# Patient Record
Sex: FEMALE | Race: WHITE | NOT HISPANIC OR LATINO | URBAN - METROPOLITAN AREA
[De-identification: names, ages, dates, MRNs, and addresses within clinical notes are randomized per-mention and may not be internally consistent; named-entity substitution may affect disease eponyms.]

---

## 2017-04-22 ENCOUNTER — OUTPATIENT (OUTPATIENT)
Dept: OUTPATIENT SERVICES | Facility: HOSPITAL | Age: 82
LOS: 1 days | Discharge: HOME | End: 2017-04-22

## 2017-06-27 DIAGNOSIS — D51.8 OTHER VITAMIN B12 DEFICIENCY ANEMIAS: ICD-10-CM

## 2017-07-12 ENCOUNTER — OUTPATIENT (OUTPATIENT)
Dept: OUTPATIENT SERVICES | Facility: HOSPITAL | Age: 82
LOS: 1 days | Discharge: HOME | End: 2017-07-12

## 2017-07-12 DIAGNOSIS — E78.5 HYPERLIPIDEMIA, UNSPECIFIED: ICD-10-CM

## 2017-07-12 DIAGNOSIS — D64.9 ANEMIA, UNSPECIFIED: ICD-10-CM
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
## 2017-07-12 DIAGNOSIS — N39.0 URINARY TRACT INFECTION, SITE NOT SPECIFIED: ICD-10-CM

## 2017-07-12 DIAGNOSIS — E53.8 DEFICIENCY OF OTHER SPECIFIED B GROUP VITAMINS: ICD-10-CM

## 2017-07-12 DIAGNOSIS — I63.9 CEREBRAL INFARCTION, UNSPECIFIED: ICD-10-CM

## 2017-07-12 DIAGNOSIS — E55.9 VITAMIN D DEFICIENCY, UNSPECIFIED: ICD-10-CM

## 2017-07-12 DIAGNOSIS — E11.9 TYPE 2 DIABETES MELLITUS WITHOUT COMPLICATIONS: ICD-10-CM

## 2018-07-19 ENCOUNTER — OUTPATIENT (OUTPATIENT)
Dept: OUTPATIENT SERVICES | Facility: HOSPITAL | Age: 83
LOS: 1 days | Discharge: HOME | End: 2018-07-19

## 2018-07-19 DIAGNOSIS — I83.813 VARICOSE VEINS OF BILATERAL LOWER EXTREMITIES WITH PAIN: ICD-10-CM

## 2018-07-19 DIAGNOSIS — R10.9 UNSPECIFIED ABDOMINAL PAIN: ICD-10-CM

## 2018-07-19 DIAGNOSIS — I87.9 DISORDER OF VEIN, UNSPECIFIED: ICD-10-CM

## 2018-07-19 DIAGNOSIS — M25.569 PAIN IN UNSPECIFIED KNEE: ICD-10-CM

## 2019-07-16 ENCOUNTER — OUTPATIENT (OUTPATIENT)
Dept: OUTPATIENT SERVICES | Facility: HOSPITAL | Age: 84
LOS: 1 days | Discharge: HOME | End: 2019-07-16
Payer: MEDICARE

## 2019-07-16 DIAGNOSIS — R10.30 LOWER ABDOMINAL PAIN, UNSPECIFIED: ICD-10-CM

## 2019-07-16 PROCEDURE — 74177 CT ABD & PELVIS W/CONTRAST: CPT | Mod: 26

## 2019-07-30 ENCOUNTER — APPOINTMENT (OUTPATIENT)
Dept: GYNECOLOGIC ONCOLOGY | Facility: CLINIC | Age: 84
End: 2019-07-30
Payer: MEDICARE

## 2019-07-30 ENCOUNTER — OUTPATIENT (OUTPATIENT)
Dept: OUTPATIENT SERVICES | Facility: HOSPITAL | Age: 84
LOS: 1 days | Discharge: HOME | End: 2019-07-30

## 2019-07-30 VITALS
HEART RATE: 78 BPM | DIASTOLIC BLOOD PRESSURE: 80 MMHG | WEIGHT: 222 LBS | BODY MASS INDEX: 35.68 KG/M2 | RESPIRATION RATE: 20 BRPM | HEIGHT: 66 IN | TEMPERATURE: 98 F | SYSTOLIC BLOOD PRESSURE: 140 MMHG

## 2019-07-30 DIAGNOSIS — Z87.891 PERSONAL HISTORY OF NICOTINE DEPENDENCE: ICD-10-CM

## 2019-07-30 DIAGNOSIS — K21.9 GASTRO-ESOPHAGEAL REFLUX DISEASE W/OUT ESOPHAGITIS: ICD-10-CM

## 2019-07-30 DIAGNOSIS — Z80.0 FAMILY HISTORY OF MALIGNANT NEOPLASM OF DIGESTIVE ORGANS: ICD-10-CM

## 2019-07-30 DIAGNOSIS — Z86.73 PERSONAL HISTORY OF TRANSIENT ISCHEMIC ATTACK (TIA), AND CEREBRAL INFARCTION W/OUT RESIDUAL DEFICITS: ICD-10-CM

## 2019-07-30 DIAGNOSIS — Z86.59 PERSONAL HISTORY OF OTHER MENTAL AND BEHAVIORAL DISORDERS: ICD-10-CM

## 2019-07-30 DIAGNOSIS — Z78.9 OTHER SPECIFIED HEALTH STATUS: ICD-10-CM

## 2019-07-30 DIAGNOSIS — N85.00 ENDOMETRIAL HYPERPLASIA, UNSPECIFIED: ICD-10-CM

## 2019-07-30 DIAGNOSIS — R32 UNSPECIFIED URINARY INCONTINENCE: ICD-10-CM

## 2019-07-30 DIAGNOSIS — M05.20 RHEUMATOID VASCULITIS WITH RHEUMATOID ARTHRITIS OF UNSPECIFIED SITE: ICD-10-CM

## 2019-07-30 DIAGNOSIS — Z80.3 FAMILY HISTORY OF MALIGNANT NEOPLASM OF BREAST: ICD-10-CM

## 2019-07-30 DIAGNOSIS — N95.0 POSTMENOPAUSAL BLEEDING: ICD-10-CM

## 2019-07-30 PROCEDURE — 99204 OFFICE O/P NEW MOD 45 MIN: CPT | Mod: 25

## 2019-07-30 NOTE — PROCEDURE
[Cervical Pap] : cervical pap smear  [Other: ___] : [unfilled] [Patient] : the patient [Thickened Endometrium] : thickened endometrium [Verbal Consent] : verbal consent was obtained prior to the procedure and is detailed in the patient's record [Guardian] : the guardian

## 2019-07-30 NOTE — REVIEW OF SYSTEMS
[Negative] : Musculoskeletal [Abn Vag Bleeding] : abnormal vaginal bleeding [Incontinence] : incontinence [Hematuria] : no hematuria [Dysuria] : no dysuria [Vaginal Discharge] : no vaginal discharge [Dyspareunia] : no dyspareunia [Normal Sexual Function] : abnormal sexual function

## 2019-07-30 NOTE — PHYSICAL EXAM
[Normal] : Recto-Vaginal Exam: Normal [Abnormal] : Cervix: Abnormal [de-identified] : abnormally firm and nodular [Capable of only limited self care, confined to bed or chair more than 50% of waking hours] : Status 3- Capable of only limited self care, confined to bed or chair more than 50% of waking hours

## 2019-07-30 NOTE — DISCUSSION/SUMMARY
[FreeTextEntry1] : 85 yo P4 w/ LMP of 55 yo, w/ h/o CVA, here for postmenopausal bleeding and a thickened endometrium on imaging. The plan discussed with the pt and her son. Once medical clearance is obtained, the pt will be set up for examination under anesthesia and a D&C. \par

## 2019-07-30 NOTE — PAST MEDICAL HISTORY
[Menarche Age ____] : age at menarche was [unfilled] [Approximately ___] : the LMP was approximately [unfilled] [Menopause Age____] : age at menopause was [unfilled] [Total Preg ___] : G[unfilled] [Full Term ___] : Full Term: [unfilled] [Live Births ___] : P[unfilled]  [Living ___] : Living: [unfilled] [AB Spont ___] : miscarriages: [unfilled]  [Postmenopausal] : The patient is postmenopausal [Normal Amount/Duration] : it was of a normal amount and duration [Premature ___] : Premature: [unfilled] [Regular Cycle Intervals] : have been regular [Abortions ___] : Abortions:[unfilled] [Multiple Births ___] :  multiple birth pregnancies: [unfilled] [AB Induced ___] : elective abortions: [unfilled]  [Ectopics ___] : ectopic pregnancies: [unfilled]

## 2019-07-30 NOTE — LETTER BODY
[Dear  ___] : Dear  [unfilled], [I recently saw our patient [unfilled] for a follow-up visit.] : I recently saw our patient, [unfilled] for a follow-up visit. [Attached please find my note.] : Attached please find my note.

## 2019-07-30 NOTE — OB HISTORY
[Full Term ___] : [unfilled] (full-term) [Total Preg ___] : : [unfilled] [Premature ___] : [unfilled] (premature) [Abortions ___] : [unfilled] (abortions) [Living ___] : [unfilled] (living) [ ___] : [unfilled]  section delivery(s) [Vaginal ___] : [unfilled] vaginal delivery(s) [AB Spont ___] : [unfilled] miscarriage(s) [AB Induced ___] : [unfilled] elective (s) [Ectopics ___] : [unfilled] ectopic pregnancies [Multiple Births ___] : [unfilled] multiple births [Regular Cycle Intervals] : periods have been regular [Normal Amount/Duration] : was of a normal amount and duration [Menopause  Age ____] : menopause occurred at age [unfilled] [Menarche Age ____] : age at menarche was [unfilled]

## 2019-07-30 NOTE — HISTORY OF PRESENT ILLNESS
[FreeTextEntry1] : 83 yo , postmenopausal female, w/ LMP at age 54, w/ multiple comorbidities, w/ recent stroke, here for postmenopausal bleeding a couple of years ago w/ incidental endometrial thickening of 3.3 cm on CT scan. Pt reports last seeing a GYN 20 years ago, Dr. Silva. Pt was referred by her PMD Dr. Jerez. Since her last postmenopausal bleeding, a couple of years ago, denies any vaginal bleeding. Pt reports urinary incontinence and constipation for years now. \par \par Last mammogram: had all of the necessary screenings - all wnl per pt \par Last colonosocopy: a few years ago - all wnl per pt

## 2019-08-01 DIAGNOSIS — Z00.00 ENCOUNTER FOR GENERAL ADULT MEDICAL EXAMINATION WITHOUT ABNORMAL FINDINGS: ICD-10-CM

## 2019-08-02 LAB — HPV HIGH+LOW RISK DNA PNL CVX: NOT DETECTED

## 2019-08-07 ENCOUNTER — CHART COPY (OUTPATIENT)
Age: 84
End: 2019-08-07

## 2019-08-08 ENCOUNTER — OUTPATIENT (OUTPATIENT)
Dept: OUTPATIENT SERVICES | Facility: HOSPITAL | Age: 84
LOS: 1 days | Discharge: HOME | End: 2019-08-08
Payer: MEDICARE

## 2019-08-08 VITALS
DIASTOLIC BLOOD PRESSURE: 99 MMHG | WEIGHT: 176.37 LBS | OXYGEN SATURATION: 98 % | HEART RATE: 68 BPM | HEIGHT: 66 IN | RESPIRATION RATE: 18 BRPM | TEMPERATURE: 98 F | SYSTOLIC BLOOD PRESSURE: 154 MMHG

## 2019-08-08 DIAGNOSIS — Z90.89 ACQUIRED ABSENCE OF OTHER ORGANS: Chronic | ICD-10-CM

## 2019-08-08 DIAGNOSIS — Z01.818 ENCOUNTER FOR OTHER PREPROCEDURAL EXAMINATION: ICD-10-CM

## 2019-08-08 DIAGNOSIS — Z98.51 TUBAL LIGATION STATUS: Chronic | ICD-10-CM

## 2019-08-08 DIAGNOSIS — R19.00 INTRA-ABDOMINAL AND PELVIC SWELLING, MASS AND LUMP, UNSPECIFIED SITE: ICD-10-CM

## 2019-08-08 DIAGNOSIS — Z92.89 PERSONAL HISTORY OF OTHER MEDICAL TREATMENT: Chronic | ICD-10-CM

## 2019-08-08 LAB
ALBUMIN SERPL ELPH-MCNC: 3.3 G/DL — LOW (ref 3.5–5.2)
ALP SERPL-CCNC: 115 U/L — SIGNIFICANT CHANGE UP (ref 30–115)
ALT FLD-CCNC: 6 U/L — SIGNIFICANT CHANGE UP (ref 0–41)
ANION GAP SERPL CALC-SCNC: 13 MMOL/L — SIGNIFICANT CHANGE UP (ref 7–14)
APPEARANCE UR: CLEAR — SIGNIFICANT CHANGE UP
APTT BLD: 39.4 SEC — HIGH (ref 27–39.2)
AST SERPL-CCNC: 12 U/L — SIGNIFICANT CHANGE UP (ref 0–41)
BACTERIA # UR AUTO: ABNORMAL
BASOPHILS # BLD AUTO: 0.03 K/UL — SIGNIFICANT CHANGE UP (ref 0–0.2)
BASOPHILS NFR BLD AUTO: 0.2 % — SIGNIFICANT CHANGE UP (ref 0–1)
BILIRUB SERPL-MCNC: 0.2 MG/DL — SIGNIFICANT CHANGE UP (ref 0.2–1.2)
BILIRUB UR-MCNC: NEGATIVE — SIGNIFICANT CHANGE UP
BUN SERPL-MCNC: 21 MG/DL — HIGH (ref 10–20)
CALCIUM SERPL-MCNC: 9 MG/DL — SIGNIFICANT CHANGE UP (ref 8.5–10.1)
CHLORIDE SERPL-SCNC: 103 MMOL/L — SIGNIFICANT CHANGE UP (ref 98–110)
CO2 SERPL-SCNC: 25 MMOL/L — SIGNIFICANT CHANGE UP (ref 17–32)
COLOR SPEC: YELLOW — SIGNIFICANT CHANGE UP
CREAT SERPL-MCNC: 0.9 MG/DL — SIGNIFICANT CHANGE UP (ref 0.7–1.5)
DIFF PNL FLD: NEGATIVE — SIGNIFICANT CHANGE UP
EOSINOPHIL # BLD AUTO: 0.19 K/UL — SIGNIFICANT CHANGE UP (ref 0–0.7)
EOSINOPHIL NFR BLD AUTO: 1.5 % — SIGNIFICANT CHANGE UP (ref 0–8)
EPI CELLS # UR: 0 /HPF — SIGNIFICANT CHANGE UP (ref 0–5)
ESTIMATED AVERAGE GLUCOSE: 114 MG/DL — SIGNIFICANT CHANGE UP (ref 68–114)
GLUCOSE SERPL-MCNC: 92 MG/DL — SIGNIFICANT CHANGE UP (ref 70–99)
GLUCOSE UR QL: NEGATIVE — SIGNIFICANT CHANGE UP
HBA1C BLD-MCNC: 5.6 % — SIGNIFICANT CHANGE UP (ref 4–5.6)
HCT VFR BLD CALC: 34.1 % — LOW (ref 37–47)
HGB BLD-MCNC: 10.6 G/DL — LOW (ref 12–16)
HYALINE CASTS # UR AUTO: 1 /LPF — SIGNIFICANT CHANGE UP (ref 0–7)
IMM GRANULOCYTES NFR BLD AUTO: 0.7 % — HIGH (ref 0.1–0.3)
INR BLD: 1.04 RATIO — SIGNIFICANT CHANGE UP (ref 0.65–1.3)
KETONES UR-MCNC: NEGATIVE — SIGNIFICANT CHANGE UP
LEUKOCYTE ESTERASE UR-ACNC: ABNORMAL
LYMPHOCYTES # BLD AUTO: 16.1 % — LOW (ref 20.5–51.1)
LYMPHOCYTES # BLD AUTO: 2.09 K/UL — SIGNIFICANT CHANGE UP (ref 1.2–3.4)
MCHC RBC-ENTMCNC: 31.1 G/DL — LOW (ref 32–37)
MCHC RBC-ENTMCNC: 33.1 PG — HIGH (ref 27–31)
MCV RBC AUTO: 106.6 FL — HIGH (ref 81–99)
MONOCYTES # BLD AUTO: 0.46 K/UL — SIGNIFICANT CHANGE UP (ref 0.1–0.6)
MONOCYTES NFR BLD AUTO: 3.5 % — SIGNIFICANT CHANGE UP (ref 1.7–9.3)
NEUTROPHILS # BLD AUTO: 10.16 K/UL — HIGH (ref 1.4–6.5)
NEUTROPHILS NFR BLD AUTO: 78 % — HIGH (ref 42.2–75.2)
NITRITE UR-MCNC: POSITIVE
NRBC # BLD: 0 /100 WBCS — SIGNIFICANT CHANGE UP (ref 0–0)
PH UR: 5.5 — SIGNIFICANT CHANGE UP (ref 5–8)
PLATELET # BLD AUTO: 461 K/UL — HIGH (ref 130–400)
POTASSIUM SERPL-MCNC: 5.3 MMOL/L — HIGH (ref 3.5–5)
POTASSIUM SERPL-SCNC: 5.3 MMOL/L — HIGH (ref 3.5–5)
PROT SERPL-MCNC: 6.6 G/DL — SIGNIFICANT CHANGE UP (ref 6–8)
PROT UR-MCNC: SIGNIFICANT CHANGE UP
PROTHROM AB SERPL-ACNC: 11.9 SEC — SIGNIFICANT CHANGE UP (ref 9.95–12.87)
RBC # BLD: 3.2 M/UL — LOW (ref 4.2–5.4)
RBC # FLD: 14.2 % — SIGNIFICANT CHANGE UP (ref 11.5–14.5)
RBC CASTS # UR COMP ASSIST: 2 /HPF — SIGNIFICANT CHANGE UP (ref 0–4)
SODIUM SERPL-SCNC: 141 MMOL/L — SIGNIFICANT CHANGE UP (ref 135–146)
SP GR SPEC: 1.02 — SIGNIFICANT CHANGE UP (ref 1.01–1.02)
UROBILINOGEN FLD QL: SIGNIFICANT CHANGE UP
WBC # BLD: 13.02 K/UL — HIGH (ref 4.8–10.8)
WBC # FLD AUTO: 13.02 K/UL — HIGH (ref 4.8–10.8)
WBC UR QL: 37 /HPF — HIGH (ref 0–5)

## 2019-08-08 PROCEDURE — 71046 X-RAY EXAM CHEST 2 VIEWS: CPT | Mod: 26

## 2019-08-08 PROCEDURE — 93010 ELECTROCARDIOGRAM REPORT: CPT

## 2019-08-08 NOTE — H&P PST ADULT - NSICDXPASTMEDICALHX_GEN_ALL_CORE_FT
PAST MEDICAL HISTORY:  Anxiety     Arthritis     Depression     Diabetes mellitus     GERD (gastroesophageal reflux disease)     H/O rheumatoid arthritis     High cholesterol     HTN (hypertension)     Stroke 2017 PAST MEDICAL HISTORY:  Anxiety     Arthritis     Cerebral infarction due to vascular stenosis     Depression     Diabetes mellitus     GERD (gastroesophageal reflux disease)     H/O rheumatoid arthritis     High cholesterol     HTN (hypertension)     Right-sided muscle weakness     Stroke 2017

## 2019-08-08 NOTE — H&P PST ADULT - NSICDXFAMILYHX_GEN_ALL_CORE_FT
FAMILY HISTORY:  FH: breast cancer  FH: colon cancer  FH: heart attack  FH: lung cancer  FH: pancreatic cancer

## 2019-08-08 NOTE — H&P PST ADULT - NSICDXPASTSURGICALHX_GEN_ALL_CORE_FT
PAST SURGICAL HISTORY:  H/O tubal ligation     History of dental surgery     History of tonsillectomy

## 2019-08-08 NOTE — H&P PST ADULT - REASON FOR ADMISSION
patient presents for d & c. pt denies current cp,palp,cough,dysuria, fever.   cannot walk 1 fos or any blocks due to pain and stroke limiting mobility. reports mild sob upon exertion.  pt states this is complete med/surg history.

## 2019-08-22 ENCOUNTER — RESULT REVIEW (OUTPATIENT)
Age: 84
End: 2019-08-22

## 2019-08-22 ENCOUNTER — APPOINTMENT (OUTPATIENT)
Dept: GYNECOLOGIC ONCOLOGY | Facility: HOSPITAL | Age: 84
End: 2019-08-22
Payer: MEDICARE

## 2019-08-22 ENCOUNTER — OUTPATIENT (OUTPATIENT)
Dept: OUTPATIENT SERVICES | Facility: HOSPITAL | Age: 84
LOS: 1 days | Discharge: HOME | End: 2019-08-22
Payer: MEDICARE

## 2019-08-22 VITALS
SYSTOLIC BLOOD PRESSURE: 155 MMHG | RESPIRATION RATE: 18 BRPM | HEIGHT: 66 IN | WEIGHT: 175.05 LBS | TEMPERATURE: 98 F | HEART RATE: 57 BPM | DIASTOLIC BLOOD PRESSURE: 74 MMHG

## 2019-08-22 VITALS
OXYGEN SATURATION: 97 % | RESPIRATION RATE: 16 BRPM | SYSTOLIC BLOOD PRESSURE: 144 MMHG | DIASTOLIC BLOOD PRESSURE: 95 MMHG | HEART RATE: 72 BPM

## 2019-08-22 DIAGNOSIS — Z92.89 PERSONAL HISTORY OF OTHER MEDICAL TREATMENT: Chronic | ICD-10-CM

## 2019-08-22 DIAGNOSIS — Z98.51 TUBAL LIGATION STATUS: Chronic | ICD-10-CM

## 2019-08-22 DIAGNOSIS — Z90.89 ACQUIRED ABSENCE OF OTHER ORGANS: Chronic | ICD-10-CM

## 2019-08-22 PROBLEM — M62.81 MUSCLE WEAKNESS (GENERALIZED): Chronic | Status: ACTIVE | Noted: 2019-08-08

## 2019-08-22 PROBLEM — F32.9 MAJOR DEPRESSIVE DISORDER, SINGLE EPISODE, UNSPECIFIED: Chronic | Status: ACTIVE | Noted: 2019-08-08

## 2019-08-22 PROBLEM — E11.9 TYPE 2 DIABETES MELLITUS WITHOUT COMPLICATIONS: Chronic | Status: ACTIVE | Noted: 2019-08-08

## 2019-08-22 PROBLEM — I63.9 CEREBRAL INFARCTION, UNSPECIFIED: Chronic | Status: ACTIVE | Noted: 2019-08-08

## 2019-08-22 PROBLEM — F41.9 ANXIETY DISORDER, UNSPECIFIED: Chronic | Status: ACTIVE | Noted: 2019-08-08

## 2019-08-22 PROBLEM — M19.90 UNSPECIFIED OSTEOARTHRITIS, UNSPECIFIED SITE: Chronic | Status: ACTIVE | Noted: 2019-08-08

## 2019-08-22 PROBLEM — E78.00 PURE HYPERCHOLESTEROLEMIA, UNSPECIFIED: Chronic | Status: ACTIVE | Noted: 2019-08-08

## 2019-08-22 PROBLEM — Z87.39 PERSONAL HISTORY OF OTHER DISEASES OF THE MUSCULOSKELETAL SYSTEM AND CONNECTIVE TISSUE: Chronic | Status: ACTIVE | Noted: 2019-08-08

## 2019-08-22 PROBLEM — I10 ESSENTIAL (PRIMARY) HYPERTENSION: Chronic | Status: ACTIVE | Noted: 2019-08-08

## 2019-08-22 PROBLEM — K21.9 GASTRO-ESOPHAGEAL REFLUX DISEASE WITHOUT ESOPHAGITIS: Chronic | Status: ACTIVE | Noted: 2019-08-08

## 2019-08-22 LAB
ABO RH CONFIRMATION: SIGNIFICANT CHANGE UP
BLD GP AB SCN SERPL QL: SIGNIFICANT CHANGE UP

## 2019-08-22 PROCEDURE — 88344 IMHCHEM/IMCYTCHM EA MLT ANTB: CPT | Mod: 26,59

## 2019-08-22 PROCEDURE — 57522 CONIZATION OF CERVIX: CPT | Mod: 22

## 2019-08-22 PROCEDURE — 88342 IMHCHEM/IMCYTCHM 1ST ANTB: CPT | Mod: 26,59

## 2019-08-22 PROCEDURE — 88360 TUMOR IMMUNOHISTOCHEM/MANUAL: CPT | Mod: 26

## 2019-08-22 PROCEDURE — 88341 IMHCHEM/IMCYTCHM EA ADD ANTB: CPT | Mod: 26,59

## 2019-08-22 PROCEDURE — 88305 TISSUE EXAM BY PATHOLOGIST: CPT | Mod: 26

## 2019-08-22 PROCEDURE — 88313 SPECIAL STAINS GROUP 2: CPT | Mod: 26

## 2019-08-22 RX ORDER — ERGOCALCIFEROL 1.25 MG/1
1 CAPSULE ORAL
Qty: 0 | Refills: 0 | DISCHARGE

## 2019-08-22 RX ORDER — LOSARTAN POTASSIUM 100 MG/1
1 TABLET, FILM COATED ORAL
Qty: 0 | Refills: 0 | DISCHARGE

## 2019-08-22 RX ORDER — METOPROLOL TARTRATE 50 MG
1 TABLET ORAL
Qty: 0 | Refills: 0 | DISCHARGE

## 2019-08-22 RX ORDER — MORPHINE SULFATE 50 MG/1
2 CAPSULE, EXTENDED RELEASE ORAL
Refills: 0 | Status: DISCONTINUED | OUTPATIENT
Start: 2019-08-22 | End: 2019-08-22

## 2019-08-22 RX ORDER — TRAMADOL HYDROCHLORIDE 50 MG/1
1 TABLET ORAL
Qty: 0 | Refills: 0 | DISCHARGE

## 2019-08-22 RX ORDER — ESOMEPRAZOLE MAGNESIUM 40 MG/1
1 CAPSULE, DELAYED RELEASE ORAL
Qty: 0 | Refills: 0 | DISCHARGE

## 2019-08-22 RX ORDER — SODIUM CHLORIDE 9 MG/ML
1000 INJECTION, SOLUTION INTRAVENOUS
Refills: 0 | Status: DISCONTINUED | OUTPATIENT
Start: 2019-08-22 | End: 2019-08-22

## 2019-08-22 RX ORDER — ONDANSETRON 8 MG/1
4 TABLET, FILM COATED ORAL ONCE
Refills: 0 | Status: DISCONTINUED | OUTPATIENT
Start: 2019-08-22 | End: 2019-08-22

## 2019-08-22 RX ORDER — MEPERIDINE HYDROCHLORIDE 50 MG/ML
12.5 INJECTION INTRAMUSCULAR; INTRAVENOUS; SUBCUTANEOUS
Refills: 0 | Status: DISCONTINUED | OUTPATIENT
Start: 2019-08-22 | End: 2019-08-22

## 2019-08-22 RX ORDER — ASPIRIN/CALCIUM CARB/MAGNESIUM 324 MG
1 TABLET ORAL
Qty: 0 | Refills: 0 | DISCHARGE

## 2019-08-22 RX ORDER — FOLIC ACID 0.8 MG
1 TABLET ORAL
Qty: 0 | Refills: 0 | DISCHARGE

## 2019-08-22 RX ORDER — ACETAMINOPHEN 500 MG
2 TABLET ORAL
Qty: 0 | Refills: 0 | DISCHARGE

## 2019-08-22 RX ORDER — PIOGLITAZONE HYDROCHLORIDE 15 MG/1
1 TABLET ORAL
Qty: 0 | Refills: 0 | DISCHARGE

## 2019-08-22 RX ORDER — ZOLPIDEM TARTRATE 10 MG/1
1 TABLET ORAL
Qty: 0 | Refills: 0 | DISCHARGE

## 2019-08-22 RX ORDER — OXYCODONE AND ACETAMINOPHEN 5; 325 MG/1; MG/1
1 TABLET ORAL ONCE
Refills: 0 | Status: DISCONTINUED | OUTPATIENT
Start: 2019-08-22 | End: 2019-08-22

## 2019-08-22 RX ORDER — SIMVASTATIN 20 MG/1
1 TABLET, FILM COATED ORAL
Qty: 0 | Refills: 0 | DISCHARGE

## 2019-08-22 RX ADMIN — MORPHINE SULFATE 2 MILLIGRAM(S): 50 CAPSULE, EXTENDED RELEASE ORAL at 10:15

## 2019-08-22 RX ADMIN — SODIUM CHLORIDE 75 MILLILITER(S): 9 INJECTION, SOLUTION INTRAVENOUS at 10:01

## 2019-08-22 NOTE — BRIEF OPERATIVE NOTE - OPERATION/FINDINGS
EUA: polypoid and distorted appearing cervix, polypoid, solid on palpation, no adnexal masses, uterus small and anteverted. EUA: polypoid and distorted appearing cervix, polypoid, solid with friable tissue in endocervix , no adnexal masses, Anterior vaginal wall retraction to lower one third of vagina, c/w with clinical stage IIIA disease. Uterus small and anteverted.

## 2019-08-22 NOTE — ASU PATIENT PROFILE, ADULT - PMH
Anxiety    Arthritis    Cerebral infarction due to vascular stenosis    Depression    Diabetes mellitus    GERD (gastroesophageal reflux disease)    H/O rheumatoid arthritis    High cholesterol    HTN (hypertension)    Right-sided muscle weakness    Stroke  2017

## 2019-08-22 NOTE — ASU PREOP CHECKLIST - HIBICLENS SHOWER 1 DATE
Kidney function is stable and hemoglobin has improved so continue current treatment.   Please respond that note is received 30-Jan-2019

## 2019-08-22 NOTE — CHART NOTE - NSCHARTNOTEFT_GEN_A_CORE
PACU ANESTHESIA ADMISSION NOTE      Procedure: Exam under anesthesia, cervical biopsy and D & C  Post op diagnosis:      ____  Intubated  TV:______       Rate: ______      FiO2: ______    _X___  Patent Airway    ____  Full return of protective reflexes    ____  Full recovery from anesthesia / back to baseline status    Vitals:  T: 97.7F  HR:79   BP: 193/84  RR: 18  SpO2: --99%    Mental Status:  __X__ Awake   _____ Alert   _____ Drowsy   _____ Sedated    Nausea/Vomiting:  X____ NO  ______Yes,   See Post - Op Orders          Pain Scale (0-10):  _____    Treatment: ____ None    _X___ See Post - Op/PCA Orders    Post - Operative Fluids:   ____ Oral   ___X_ See Post - Op Orders    Plan: Discharge:   X____Home       _____Floor     _____Critical Care    _____  Other:_________________    Comments: NO anesthetic related complications noted

## 2019-08-22 NOTE — ASU DISCHARGE PLAN (ADULT/PEDIATRIC) - CARE PROVIDER_API CALL
Khloe Parker)  Gynecologic Oncology; Obstetrics and Gynecology  [address] [place] [address], [address] [place] [address]  Phone: [phone]  Fax: [phone]  Follow Up Time: 2 weeks

## 2019-08-22 NOTE — BRIEF OPERATIVE NOTE - NSICDXBRIEFPROCEDURE_GEN_ALL_CORE_FT
PROCEDURES:  Endocervical curettage 22-Aug-2019 10:07:21  Nikolay Mcmahon  Loop electrosurgical excision procedure (LEEP) 22-Aug-2019 10:07:10  Nikolay Mcmahon  Dilation and curettage, uterus 22-Aug-2019 10:06:57  Nikolay Mcmahon

## 2019-08-23 LAB — GLUCOSE BLDC GLUCOMTR-MCNC: 111 MG/DL — HIGH (ref 70–99)

## 2019-08-26 LAB — SURGICAL PATHOLOGY STUDY: SIGNIFICANT CHANGE UP

## 2019-08-27 DIAGNOSIS — I10 ESSENTIAL (PRIMARY) HYPERTENSION: ICD-10-CM

## 2019-08-27 DIAGNOSIS — Z87.891 PERSONAL HISTORY OF NICOTINE DEPENDENCE: ICD-10-CM

## 2019-08-27 DIAGNOSIS — C53.8 MALIGNANT NEOPLASM OF OVERLAPPING SITES OF CERVIX UTERI: ICD-10-CM

## 2019-08-27 DIAGNOSIS — M06.9 RHEUMATOID ARTHRITIS, UNSPECIFIED: ICD-10-CM

## 2019-08-27 DIAGNOSIS — E11.9 TYPE 2 DIABETES MELLITUS WITHOUT COMPLICATIONS: ICD-10-CM

## 2019-08-27 DIAGNOSIS — Z79.4 LONG TERM (CURRENT) USE OF INSULIN: ICD-10-CM

## 2019-08-28 ENCOUNTER — MESSAGE (OUTPATIENT)
Age: 84
End: 2019-08-28

## 2019-08-30 ENCOUNTER — OTHER (OUTPATIENT)
Age: 84
End: 2019-08-30

## 2019-08-30 RX ORDER — PIOGLITAZONE 15 MG/1
15 TABLET ORAL
Refills: 0 | Status: ACTIVE | COMMUNITY

## 2019-08-30 RX ORDER — TRAMADOL HYDROCHLORIDE 50 MG/1
50 TABLET, COATED ORAL
Refills: 0 | Status: ACTIVE | COMMUNITY

## 2019-08-30 RX ORDER — METOPROLOL TARTRATE 50 MG/1
50 TABLET, FILM COATED ORAL
Refills: 0 | Status: ACTIVE | COMMUNITY

## 2019-08-30 RX ORDER — LOSARTAN POTASSIUM 100 MG/1
TABLET, FILM COATED ORAL
Refills: 0 | Status: ACTIVE | COMMUNITY

## 2019-08-30 RX ORDER — FOLIC ACID 1 MG/1
1 TABLET ORAL
Refills: 0 | Status: ACTIVE | COMMUNITY

## 2019-08-30 RX ORDER — ASPIRIN 81 MG/1
81 TABLET ORAL
Refills: 0 | Status: ACTIVE | COMMUNITY

## 2019-08-30 RX ORDER — ZOLPIDEM TARTRATE SUBLINGUAL 3.5 MG/1
TABLET SUBLINGUAL
Refills: 0 | Status: ACTIVE | COMMUNITY

## 2019-08-30 RX ORDER — ERGOCALCIFEROL 1.25 MG/1
1.25 MG CAPSULE ORAL
Refills: 0 | Status: ACTIVE | COMMUNITY

## 2019-08-30 RX ORDER — SIMVASTATIN 20 MG/1
20 TABLET, FILM COATED ORAL
Refills: 0 | Status: ACTIVE | COMMUNITY

## 2019-09-06 ENCOUNTER — APPOINTMENT (OUTPATIENT)
Dept: GYNECOLOGIC ONCOLOGY | Facility: CLINIC | Age: 84
End: 2019-09-06
Payer: MEDICARE

## 2019-09-06 VITALS
BODY MASS INDEX: 50.91 KG/M2 | HEART RATE: 80 BPM | WEIGHT: 220 LBS | SYSTOLIC BLOOD PRESSURE: 140 MMHG | DIASTOLIC BLOOD PRESSURE: 82 MMHG | RESPIRATION RATE: 18 BRPM | HEIGHT: 55 IN | TEMPERATURE: 98 F

## 2019-09-06 VITALS
SYSTOLIC BLOOD PRESSURE: 142 MMHG | HEART RATE: 80 BPM | WEIGHT: 220 LBS | RESPIRATION RATE: 18 BRPM | BODY MASS INDEX: 35.36 KG/M2 | HEIGHT: 66 IN | DIASTOLIC BLOOD PRESSURE: 82 MMHG

## 2019-09-06 PROCEDURE — 99215 OFFICE O/P EST HI 40 MIN: CPT

## 2019-09-06 NOTE — DISCUSSION/SUMMARY
[FreeTextEntry2] : Cervical  Adeno carcinoma Stage IIIA [FreeTextEntry1] : Cervical  Adeno carcinoma Stage IIIA

## 2019-09-06 NOTE — LETTER BODY
[Dear  ___] : Dear ~TROY, [Dear  ___] : Dear  [unfilled], [I recently saw our patient [unfilled] for a follow-up visit.] : I recently saw our patient, [unfilled] for a follow-up visit.

## 2019-09-06 NOTE — ASSESSMENT
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 The patient is status post recent examination under anesthesia and biopsies which confirmed high-grade adenocarcinoma. The endocervical cavity was completely occluded by tumor and therefore the endometrium was not able to be sampled. The patient presents with her family members for further discussions. My recommendations was a combination of chemotherapy and radiation, with radiation being the more important modality of treatment. I reviewed the strategy for the above treatment with the family and emphasized importance of clinical followup. Appointments for radiation oncology and medical oncology were made for the patient as well as a return followup visit in 2 months. The patient understood the planned treatment and was given an opportunity to ask questions and discuss alternatives. She was informed that her treatment should be started as soon as possible.

## 2019-09-06 NOTE — REASON FOR VISIT
[Post Op] : post op visit [Family Member] : family member [de-identified] : august 22, 2019 [de-identified] : EUA D&C [de-identified] : Her pathlogy report confirmed High-grade adenoca involving cervix and endocervix. (Endometrium could not be assessed secondary to complete obstruction by the tumor.) Thus this could be a primary in the uterus with extension to cervix and lower vagina. Nevertheless the treatment is the same with chemotherapy along with radiation. \par

## 2019-09-13 ENCOUNTER — OUTPATIENT (OUTPATIENT)
Dept: OUTPATIENT SERVICES | Facility: HOSPITAL | Age: 84
LOS: 1 days | Discharge: HOME | End: 2019-09-13

## 2019-09-13 ENCOUNTER — APPOINTMENT (OUTPATIENT)
Dept: RADIATION ONCOLOGY | Facility: CLINIC | Age: 84
End: 2019-09-13
Payer: MEDICARE

## 2019-09-13 VITALS
HEIGHT: 66.5 IN | HEART RATE: 55 BPM | RESPIRATION RATE: 18 BRPM | TEMPERATURE: 98.6 F | BODY MASS INDEX: 26.92 KG/M2 | WEIGHT: 169.5 LBS | SYSTOLIC BLOOD PRESSURE: 95 MMHG | DIASTOLIC BLOOD PRESSURE: 51 MMHG

## 2019-09-13 DIAGNOSIS — Z98.51 TUBAL LIGATION STATUS: Chronic | ICD-10-CM

## 2019-09-13 DIAGNOSIS — Z90.89 ACQUIRED ABSENCE OF OTHER ORGANS: Chronic | ICD-10-CM

## 2019-09-13 DIAGNOSIS — Z92.89 PERSONAL HISTORY OF OTHER MEDICAL TREATMENT: Chronic | ICD-10-CM

## 2019-09-13 PROCEDURE — 99204 OFFICE O/P NEW MOD 45 MIN: CPT

## 2019-09-16 ENCOUNTER — APPOINTMENT (OUTPATIENT)
Dept: HEMATOLOGY ONCOLOGY | Facility: CLINIC | Age: 84
End: 2019-09-16
Payer: MEDICARE

## 2019-09-16 ENCOUNTER — LABORATORY RESULT (OUTPATIENT)
Age: 84
End: 2019-09-16

## 2019-09-16 VITALS
RESPIRATION RATE: 18 BRPM | DIASTOLIC BLOOD PRESSURE: 86 MMHG | SYSTOLIC BLOOD PRESSURE: 199 MMHG | BODY MASS INDEX: 26.68 KG/M2 | HEIGHT: 66.5 IN | HEART RATE: 99 BPM | WEIGHT: 168 LBS | TEMPERATURE: 98.2 F

## 2019-09-16 DIAGNOSIS — C57.9 MALIGNANT NEOPLASM OF FEMALE GENITAL ORGAN, UNSPECIFIED: ICD-10-CM

## 2019-09-16 DIAGNOSIS — C53.9 MALIGNANT NEOPLASM OF CERVIX UTERI, UNSPECIFIED: ICD-10-CM

## 2019-09-16 DIAGNOSIS — Z80.0 FAMILY HISTORY OF MALIGNANT NEOPLASM OF DIGESTIVE ORGANS: ICD-10-CM

## 2019-09-16 PROCEDURE — 99205 OFFICE O/P NEW HI 60 MIN: CPT

## 2019-09-16 NOTE — OB HISTORY
[Menarche Age: ____] : age at menarche was [unfilled] [Currently In Menopause] : currently in menopause [Menopause Age: ____] : age at menopause was [unfilled] [___] :  Spontaneous: [unfilled]

## 2019-09-17 PROBLEM — C53.9 CERVICAL CARCINOMA: Status: ACTIVE | Noted: 2019-09-06

## 2019-09-17 LAB
ALBUMIN SERPL ELPH-MCNC: 3.8 G/DL
ALP BLD-CCNC: 101 U/L
ALT SERPL-CCNC: <5 U/L
ANION GAP SERPL CALC-SCNC: 16 MMOL/L
AST SERPL-CCNC: 10 U/L
BILIRUB SERPL-MCNC: <0.2 MG/DL
BUN SERPL-MCNC: 25 MG/DL
CALCIUM SERPL-MCNC: 9.4 MG/DL
CHLORIDE SERPL-SCNC: 105 MMOL/L
CO2 SERPL-SCNC: 17 MMOL/L
CREAT SERPL-MCNC: 1 MG/DL
GLUCOSE SERPL-MCNC: 103 MG/DL
HCT VFR BLD CALC: 32.7 %
HGB BLD-MCNC: 10.2 G/DL
MCHC RBC-ENTMCNC: 31.2 G/DL
MCHC RBC-ENTMCNC: 33.1 PG
MCV RBC AUTO: 106.2 FL
PLATELET # BLD AUTO: 389 K/UL
PMV BLD: 10.3 FL
POTASSIUM SERPL-SCNC: 4.4 MMOL/L
PROT SERPL-MCNC: 6.5 G/DL
RBC # BLD: 3.08 M/UL
RBC # FLD: 12.6 %
SODIUM SERPL-SCNC: 138 MMOL/L
WBC # FLD AUTO: 13.59 K/UL

## 2019-09-18 LAB
CANCER AG125 SERPL-ACNC: 54 U/ML
FERRITIN SERPL-MCNC: 128 NG/ML

## 2019-09-22 NOTE — ASSESSMENT
[FreeTextEntry1] : 84 yof, PMH of CVA with R-sided residual weakness (2017), DM II, HTN, HLD, GERD, rheumatoid arthritis, and osteoarthritis was having lower abdominal pain.  CT abdomen/pelvis showed 3.3 cm endometrial thickening and pelvic LAD.  Patient had never had any postmenopausal bleeding.  She saw gynecologic oncology and underwent exam under anesthesia with dilation and curettage.  Pathology report demonstrated high-grade adenocarcinoma involving cervix and endocervix.  Endometrium could not assessed secondary to complete obstruction by tumor.  Tumor is p16 positive and without MMR deficiency.\par \par 1. Adenocarcinoma, cervical vs. endometrial-Patient recently established care with Dr. Boston and is scheduled for PET scan next week.  Discussed that management will depend on results of PET scan, with regard to site of apparent primary and extent of metastases.  For now, will check baseline CBC, CMP, and CA-125.  Patient is agreeable to possible chemotherapy and radiation.  Adverse effects will be discussed once more information is available.\par \par Follow up next week to determine next steps in treatment plan.\par \par Patient was seen and examined with Dr. Sneed.\par \par

## 2019-09-22 NOTE — REASON FOR VISIT
[Other: _________] : [unfilled] [Other: ___] : [unfilled] [Initial Consultation] : an initial consultation [Family Member] : family member [FreeTextEntry2] : adenocarcinoma of cervix vs. endometrium

## 2019-09-23 ENCOUNTER — OUTPATIENT (OUTPATIENT)
Dept: OUTPATIENT SERVICES | Facility: HOSPITAL | Age: 84
LOS: 1 days | Discharge: HOME | End: 2019-09-23
Payer: MEDICARE

## 2019-09-23 DIAGNOSIS — Z98.51 TUBAL LIGATION STATUS: Chronic | ICD-10-CM

## 2019-09-23 DIAGNOSIS — Z92.89 PERSONAL HISTORY OF OTHER MEDICAL TREATMENT: Chronic | ICD-10-CM

## 2019-09-23 DIAGNOSIS — C54.1 MALIGNANT NEOPLASM OF ENDOMETRIUM: ICD-10-CM

## 2019-09-23 DIAGNOSIS — Z90.89 ACQUIRED ABSENCE OF OTHER ORGANS: Chronic | ICD-10-CM

## 2019-09-23 LAB — GLUCOSE BLDC GLUCOMTR-MCNC: 99 MG/DL — SIGNIFICANT CHANGE UP (ref 70–99)

## 2019-09-23 PROCEDURE — 78815 PET IMAGE W/CT SKULL-THIGH: CPT | Mod: 26,PI

## 2019-09-24 PROBLEM — C57.9 GYNECOLOGIC MALIGNANCY: Status: ACTIVE | Noted: 2019-09-17

## 2019-09-24 NOTE — REVIEW OF SYSTEMS
[Recent Change In Weight] : ~T recent weight change [Fatigue] : fatigue [Easy Bruising] : a tendency for easy bruising [Abdominal Pain] : abdominal pain [Joint Pain] : joint pain [Negative] : Heme/Lymph [Dysmenorrhea/Abn Vaginal Bleeding] : no dysmenorrhea/abnormal vaginal bleeding [FreeTextEntry2] : history of falling, weakness [FreeTextEntry3] : glasses [FreeTextEntry5] : pain in legs, swelling of legs [de-identified] : stroke, muscle weakness [FreeTextEntry9] : swollen joints, arthritis [FreeTextEntry7] : decreased appetite [de-identified] : frequent thirst

## 2019-09-24 NOTE — LETTER CLOSING
[Consult Closing:] : Thank you for allowing me to participate in the care of this patient.  If you have any questions, please do not hesitate to contact me. [Sincerely yours,] : Sincerely yours, [FreeTextEntry3] : Georgina Boston M.D. \par \par Electronically proofread and signed by:  Georgina Boston MD\par Attending, Department of Radiation Medicine\par [facility]\par \par CC: Dr. Keturah Sneed

## 2019-09-24 NOTE — HISTORY OF PRESENT ILLNESS
[de-identified] : 84 yof, PMH of CVA with R-sided residual weakness (2017), DM II, HTN, HLD, GERD, rheumatoid arthritis, and osteoarthritis was having lower abdominal pain.  CT abdomen/pelvis showed 3.3 cm endometrial thickening and pelvic LAD.  Patient had never had any postmenopausal bleeding.  She saw gynecologic oncology and underwent exam under anesthesia with dilation and curettage.  Pathology report demonstrated high-grade adenocarcinoma involving cervix and endocervix.  Endometrium could not assessed secondary to complete obstruction by tumor.  Tumor is p16 positive and without MMR deficiency.\par \par At baseline, patient lives alone and uses a walker around the home and a wheelchair outside the home.  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  Her two sons live nearby and will be transporting her to medical appointments. [FreeTextEntry1] : The patient is an 85 yo female, PMH of CVA with R-sided residual weakness (2017), DM II, HTN, HLD, GERD, rheumatoid arthritis, and osteoarthritis who recently presented with abdominal pain. CT abdomen/pelvis showed 3.3 cm endometrial thickening and pelvic LAD. Patient had never had any postmenopausal bleeding. She saw gynecologic oncology and underwent exam under anesthesia with dilation and curettage. Pathology report demonstrated high-grade adenocarcinoma involving cervix and endocervix. Endometrium could not assessed secondary to complete obstruction by tumor. Tumor is p16 positive and without MMR deficiency.\par She is here to discuss radiation. She is accompanied by her son. Denies bleeding.  Patient lives alone, ambulates with walker at home, outside of the home via wheelchair. Patient states she gets PT at home. She gets meals delivered. She bathes herself.  C/o pain 2/10 lower abdomen, which increases to 8/10 at times. Taking tramadol with some relief. Will see Dr. Sneed on Monday.

## 2019-09-24 NOTE — HISTORY OF PRESENT ILLNESS
[de-identified] : 84 yof, PMH of CVA with R-sided residual weakness (2017), DM II, HTN, HLD, GERD, rheumatoid arthritis, and osteoarthritis was having lower abdominal pain.  CT abdomen/pelvis showed 3.3 cm endometrial thickening and pelvic LAD.  Patient had never had any postmenopausal bleeding.  She saw gynecologic oncology and underwent exam under anesthesia with dilation and curettage.  Pathology report demonstrated high-grade adenocarcinoma involving cervix and endocervix.  Endometrium could not assessed secondary to complete obstruction by tumor.  Tumor is p16 positive and without MMR deficiency.\par \par At baseline, patient lives alone and uses a walker around the home and a wheelchair outside the home.  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  Her two sons live nearby and will be transporting her to medical appointments. [FreeTextEntry1] : The patient is an 85 yo female, PMH of CVA with R-sided residual weakness (2017), DM II, HTN, HLD, GERD, rheumatoid arthritis, and osteoarthritis who recently presented with abdominal pain. CT abdomen/pelvis showed 3.3 cm endometrial thickening and pelvic LAD. Patient had never had any postmenopausal bleeding. She saw gynecologic oncology and underwent exam under anesthesia with dilation and curettage. Pathology report demonstrated high-grade adenocarcinoma involving cervix and endocervix. Endometrium could not assessed secondary to complete obstruction by tumor. Tumor is p16 positive and without MMR deficiency.\par She is here to discuss radiation. She is accompanied by her son. Denies bleeding.  Patient lives alone, ambulates with walker at home, outside of the home via wheelchair. Patient states she gets PT at home. She gets meals delivered. She bathes herself.  C/o pain 2/10 lower abdomen, which increases to 8/10 at times. Taking tramadol with some relief. Will see Dr. Sneed on Monday.

## 2019-09-24 NOTE — DISEASE MANAGEMENT
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

## 2019-09-24 NOTE — VITALS
[Maximal Pain Intensity: 8/10] : 8/10 [Least Pain Intensity: 2/10] : 2/10 [80: Normal activity with effort; some signs or symptoms of disease.] : 80: Normal activity with effort; some signs or symptoms of disease.

## 2019-09-24 NOTE — REVIEW OF SYSTEMS
[Recent Change In Weight] : ~T recent weight change [Fatigue] : fatigue [Easy Bruising] : a tendency for easy bruising [Abdominal Pain] : abdominal pain [Joint Pain] : joint pain [Negative] : Heme/Lymph [Dysmenorrhea/Abn Vaginal Bleeding] : no dysmenorrhea/abnormal vaginal bleeding [FreeTextEntry2] : history of falling, weakness [FreeTextEntry3] : glasses [FreeTextEntry5] : pain in legs, swelling of legs [FreeTextEntry7] : decreased appetite [FreeTextEntry9] : swollen joints, arthritis [de-identified] : stroke, muscle weakness [de-identified] : frequent thirst

## 2019-09-24 NOTE — END OF VISIT
[] : Fellow [Time Spent: ___ minutes] : I have spent [unfilled] minutes of face to face time with the patient [>50% of Time Spent on Counseling and Coordination of Care for  ___] : Greater than 50% of the encounter time was spent on counseling and coordination of care for [unfilled]

## 2019-09-24 NOTE — PHYSICAL EXAM
[de-identified] : diffuse generalized abdominal pain, not significantly changed with palpation, no rebound, no guarding [de-identified] : sitting in wheelchair [de-identified] : no deformities appreciated [de-identified] : External genitalia intact.  There is some loose stool noted on the perineum.  On speculum exam, there is a fungating mass visualized within the cervix and  extending into the vagina with a foul yellowish discharge.  [de-identified] : Right hemiparesis, chronic [Normal] : oriented to person, place and time, the affect was normal, the mood was normal and not anxious

## 2019-09-24 NOTE — PHYSICAL EXAM
[de-identified] : diffuse generalized abdominal pain, not significantly changed with palpation, no rebound, no guarding [de-identified] : sitting in wheelchair [de-identified] : no deformities appreciated [de-identified] : Right hemiparesis, chronic [Normal] : oriented to person, place and time, the affect was normal, the mood was normal and not anxious [de-identified] : External genitalia intact.  There is some loose stool noted on the perineum.  On speculum exam, there is a fungating mass visualized within the cervix and  extending into the vagina with a foul yellowish discharge.

## 2019-09-24 NOTE — LETTER CLOSING
[Consult Closing:] : Thank you for allowing me to participate in the care of this patient.  If you have any questions, please do not hesitate to contact me. [Sincerely yours,] : Sincerely yours, [FreeTextEntry3] : Georgina Boston M.D. \par \par Electronically proofread and signed by:  Georgina Boston MD\par Attending, Department of Radiation Medicine\par [facility]\par \par CC: Dr. Keturah Sneed

## 2019-09-25 ENCOUNTER — APPOINTMENT (OUTPATIENT)
Dept: HEMATOLOGY ONCOLOGY | Facility: CLINIC | Age: 84
End: 2019-09-25
Payer: MEDICARE

## 2019-09-25 VITALS
WEIGHT: 168 LBS | HEIGHT: 66.5 IN | DIASTOLIC BLOOD PRESSURE: 70 MMHG | RESPIRATION RATE: 18 BRPM | SYSTOLIC BLOOD PRESSURE: 144 MMHG | HEART RATE: 52 BPM | TEMPERATURE: 97.2 F | BODY MASS INDEX: 26.68 KG/M2

## 2019-09-25 DIAGNOSIS — E11.9 TYPE 2 DIABETES MELLITUS W/OUT COMPLICATIONS: ICD-10-CM

## 2019-09-25 DIAGNOSIS — I10 ESSENTIAL (PRIMARY) HYPERTENSION: ICD-10-CM

## 2019-09-25 DIAGNOSIS — M19.90 UNSPECIFIED OSTEOARTHRITIS, UNSPECIFIED SITE: ICD-10-CM

## 2019-09-25 PROCEDURE — 99215 OFFICE O/P EST HI 40 MIN: CPT

## 2019-09-25 RX ORDER — INFLUENZA VIRUS VACCINE 15; 15; 15; 15 UG/.5ML; UG/.5ML; UG/.5ML; UG/.5ML
0.5 SUSPENSION INTRAMUSCULAR ONCE
Refills: 0 | Status: COMPLETED | OUTPATIENT
Start: 2019-09-25 | End: 2019-09-25

## 2019-09-25 RX ADMIN — INFLUENZA VIRUS VACCINE 0.5 MILLILITER(S): 15; 15; 15; 15 SUSPENSION INTRAMUSCULAR at 13:23

## 2019-09-27 ENCOUNTER — APPOINTMENT (OUTPATIENT)
Dept: INFUSION THERAPY | Facility: CLINIC | Age: 84
End: 2019-09-27

## 2019-09-27 ENCOUNTER — APPOINTMENT (OUTPATIENT)
Dept: HEMATOLOGY ONCOLOGY | Facility: CLINIC | Age: 84
End: 2019-09-27

## 2019-09-27 ENCOUNTER — APPOINTMENT (OUTPATIENT)
Dept: GYNECOLOGIC ONCOLOGY | Facility: CLINIC | Age: 84
End: 2019-09-27

## 2019-09-27 DIAGNOSIS — C54.1 MALIGNANT NEOPLASM OF ENDOMETRIUM: ICD-10-CM

## 2019-09-27 RX ORDER — ONDANSETRON 8 MG/1
8 TABLET ORAL 3 TIMES DAILY
Qty: 30 | Refills: 3 | Status: ACTIVE | COMMUNITY
Start: 2019-09-27 | End: 1900-01-01

## 2019-09-27 RX ORDER — PROCHLORPERAZINE MALEATE 10 MG/1
10 TABLET ORAL
Qty: 120 | Refills: 2 | Status: ACTIVE | COMMUNITY
Start: 2019-09-27 | End: 1900-01-01

## 2019-10-02 ENCOUNTER — APPOINTMENT (OUTPATIENT)
Dept: INFUSION THERAPY | Facility: CLINIC | Age: 84
End: 2019-10-02

## 2019-10-02 ENCOUNTER — OTHER (OUTPATIENT)
Age: 84
End: 2019-10-02

## 2019-10-02 ENCOUNTER — LABORATORY RESULT (OUTPATIENT)
Age: 84
End: 2019-10-02

## 2019-10-02 DIAGNOSIS — Z00.00 ENCOUNTER FOR GENERAL ADULT MEDICAL EXAMINATION W/OUT ABNORMAL FINDINGS: ICD-10-CM

## 2019-10-02 LAB
HCT VFR BLD CALC: 34.7 %
HGB BLD-MCNC: 11 G/DL
MCHC RBC-ENTMCNC: 31.7 G/DL
MCHC RBC-ENTMCNC: 32.4 PG
MCV RBC AUTO: 102.1 FL
PLATELET # BLD AUTO: 510 K/UL
PMV BLD: 10.4 FL
RBC # BLD: 3.4 M/UL
RBC # FLD: 12.3 %
WBC # FLD AUTO: 15.67 K/UL

## 2019-10-02 RX ORDER — DEXAMETHASONE 0.5 MG/5ML
12 ELIXIR ORAL ONCE
Refills: 0 | Status: COMPLETED | OUTPATIENT
Start: 2019-10-02 | End: 2019-10-02

## 2019-10-02 RX ORDER — METOPROLOL TARTRATE 50 MG
50 TABLET ORAL ONCE
Refills: 0 | Status: COMPLETED | OUTPATIENT
Start: 2019-10-02 | End: 2019-10-02

## 2019-10-02 RX ORDER — CARBOPLATIN 50 MG
155 VIAL (EA) INTRAVENOUS ONCE
Refills: 0 | Status: COMPLETED | OUTPATIENT
Start: 2019-10-02 | End: 2019-10-02

## 2019-10-02 RX ORDER — DIPHENHYDRAMINE HCL 50 MG
50 CAPSULE ORAL ONCE
Refills: 0 | Status: COMPLETED | OUTPATIENT
Start: 2019-10-02 | End: 2019-10-02

## 2019-10-02 RX ORDER — FAMOTIDINE 10 MG/ML
20 INJECTION INTRAVENOUS ONCE
Refills: 0 | Status: COMPLETED | OUTPATIENT
Start: 2019-10-02 | End: 2019-10-02

## 2019-10-02 RX ORDER — PACLITAXEL 6 MG/ML
110 INJECTION, SOLUTION, CONCENTRATE INTRAVENOUS ONCE
Refills: 0 | Status: COMPLETED | OUTPATIENT
Start: 2019-10-02 | End: 2019-10-02

## 2019-10-02 RX ADMIN — Medication 50 MILLIGRAM(S): at 11:08

## 2019-10-02 RX ADMIN — PACLITAXEL 268.33 MILLIGRAM(S): 6 INJECTION, SOLUTION, CONCENTRATE INTRAVENOUS at 11:49

## 2019-10-02 RX ADMIN — FAMOTIDINE 156 MILLIGRAM(S): 10 INJECTION INTRAVENOUS at 11:10

## 2019-10-02 RX ADMIN — Medication 12 MILLIGRAM(S): at 11:10

## 2019-10-02 RX ADMIN — Medication 183 MILLIGRAM(S): at 10:50

## 2019-10-02 RX ADMIN — Medication 50 MILLIGRAM(S): at 11:50

## 2019-10-02 RX ADMIN — Medication 265.5 MILLIGRAM(S): at 11:50

## 2019-10-02 RX ADMIN — Medication 153 MILLIGRAM(S): at 11:30

## 2019-10-02 RX ADMIN — FAMOTIDINE 20 MILLIGRAM(S): 10 INJECTION INTRAVENOUS at 11:30

## 2019-10-03 LAB
ALBUMIN SERPL ELPH-MCNC: 3.4 G/DL
ALP BLD-CCNC: 94 U/L
ALT SERPL-CCNC: <5 U/L
ANION GAP SERPL CALC-SCNC: 15 MMOL/L
AST SERPL-CCNC: 12 U/L
BILIRUB SERPL-MCNC: <0.2 MG/DL
BUN SERPL-MCNC: 35 MG/DL
CALCIUM SERPL-MCNC: 9.3 MG/DL
CHLORIDE SERPL-SCNC: 101 MMOL/L
CO2 SERPL-SCNC: 20 MMOL/L
CREAT SERPL-MCNC: 1.2 MG/DL
FOLATE SERPL-MCNC: >20 NG/ML
GLUCOSE SERPL-MCNC: 104 MG/DL
POTASSIUM SERPL-SCNC: 5 MMOL/L
PROT SERPL-MCNC: 6.3 G/DL
SODIUM SERPL-SCNC: 136 MMOL/L
T4 FREE SERPL-MCNC: 0.9 NG/DL
TSH SERPL-ACNC: 2.29 UIU/ML
VIT B12 SERPL-MCNC: 304 PG/ML

## 2019-10-04 PROBLEM — I10 HYPERTENSION: Status: ACTIVE | Noted: 2019-07-30

## 2019-10-04 PROBLEM — M19.90 OSTEOARTHRITIS: Status: ACTIVE | Noted: 2019-07-30

## 2019-10-04 PROBLEM — E11.9 DIABETES: Status: ACTIVE | Noted: 2019-07-30

## 2019-10-04 NOTE — CONSULT LETTER
[DrAngel  ___] : Dr. BERG [DrAngel ___] : Dr. BERG [Dear  ___] : Dear  [unfilled], [Consult Letter:] : I had the pleasure of evaluating your patient, [unfilled]. [Please see my note below.] : Please see my note below. [Sincerely,] : Sincerely, [Consult Closing:] : Thank you very much for allowing me to participate in the care of this patient.  If you have any questions, please do not hesitate to contact me. [FreeTextEntry3] : Keturah Sneed MD\par Attending Physician\par Cancer Services\par [facility]\par

## 2019-10-04 NOTE — REASON FOR VISIT
[Family Member] : family member [Follow-Up Visit] : a follow-up [FreeTextEntry2] : adenocarcinoma of cervix vs. endometrium

## 2019-10-04 NOTE — REVIEW OF SYSTEMS
[Abdominal Pain] : abdominal pain [Joint Pain] : joint pain [Negative] : Heme/Lymph [Dysmenorrhea/Abn Vaginal Bleeding] : no dysmenorrhea/abnormal vaginal bleeding [FreeTextEntry9] : diffuse secondary to RA and OA [de-identified] : R-sided residual weakness secondary to CVA

## 2019-10-04 NOTE — HISTORY OF PRESENT ILLNESS
[de-identified] : 84 yof, PMH of CVA with R-sided residual weakness (2017), DM II, HTN, HLD, GERD, rheumatoid arthritis, and osteoarthritis was having lower abdominal pain.  CT abdomen/pelvis showed 3.3 cm endometrial thickening and pelvic LAD.  Patient had never had any postmenopausal bleeding.  She saw gynecologic oncology and underwent exam under anesthesia with dilation and curettage.  Pathology report demonstrated high-grade adenocarcinoma involving cervix and endocervix.  Endometrium could not assessed secondary to complete obstruction by tumor.  Tumor is p16 positive and without MMR deficiency.\par \par At baseline, patient lives alone and uses a walker around the home and a wheelchair outside the home.  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  Her two sons live nearby and will be transporting her to medical appointments. [de-identified] : 9/25/19\par pt is here for follow up.\par She has completed the PET scan and wants to discuss further plan for care.\par She still remains active.\par C/o abdominal pain for which she is on Tramadol as prescribed by her PCP,\par No vaginal bleeding

## 2019-10-04 NOTE — ASSESSMENT
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 left 5th rib.\par \par Pt has an ECOG of 2-3.\par Even though she is on a wheel chair she is able to ambulate with the assistance of a walker and thereby is managing most ADLs.\par \par RECOMMENDATIONS\par \par I had a detailed discussion with the patient and her family.  I informed them about the findings of the PET scan, which showed extensive metastatic disease involving the peritoneum, omentum, liver, lymph nodes, and possibly the left fifth rib.  I discussed that the treatment will be palliative in nature.  \par \par I offered chemotherapy with Carboplatin and Taxol.\par Given that she has a borderline performance status  chemotherapy dose will be reduced and given on a weekly schedule.  The side effects from chemotherapy including, but not limited to, nausea, vomiting, diarrhea, myelosuppression, risk of infection, neuropathy, infusion reactions, have all been discussed with the patient and her family.  After 3 cycles of chemotherapy have been completed.  the patient will undergo rescanning to assess response to treatment and further treatment will be decided accordingly.\par The chemotherapy dose was adjusted according to pt's height, weight, labs and anticipated tolerance.\par The high risk of complications and complexity associated with antineoplastic therapy administration has been explained to the pt and family.\par Chemotherapy will be administered under my direct supervision\par \par Prognosis was also discussed at length.\par All the questions and concerns addressed by the patient and her family were addressed to their satisfaction.\par Patient was also advised to seek consultation with pain management service, and a referral was provided

## 2019-10-04 NOTE — RESULTS/DATA
[FreeTextEntry1] :  PETCT [facility] ONC FDG INIT \par \par \par PROCEDURE DATE: 09/23/2019 \par \par \par \par \par INTERPRETATION: CLINICAL STATEMENT: Cervical cancer. \par \par FDG PET CT STUDY, INITIAL TREATMENT STRATEGY \par REASON: TUMOR IMAGING - PET with concurrently acquired CT for attenuation \par correction and anatomic localization; skull base to mid - thigh / [id number] \par \par HISTORY: 84 year old female with history of cervical/endometrial \par adenocarcinoma, presenting for initial PET/CT evaluation. Abdominopelvic CT \par dated July 16, 2018 demonstrated an abnormally thickened endometrium and \par prominent left iliac chain lymph nodes. \par \par Blood glucose pre injection 99 mg/dL. \par \par TECHNIQUE: Approximately 45 minutes after the intravenous administration of \par 10.0 mCi 18-Fluorine FDG, whole body PET images were acquired from base of \par skull to mid - thigh. In addition, non-contrast, low dose, non - diagnostic \par CT was acquired for attenuation correction and anatomic correlation purposes \par only. \par \par COMPARISON PET/CT: None. \par \par \par FINDINGS: \par \par HEAD/FACE: Physiologic FDG uptake is seen in the visualized regions of the \par brain, large salivary glands and oropharynx. \par \par NECK: Physiologic FDG uptake is seen in neck muscles. \par \par CHEST: Physiologic FDG avidity is seen in mediastinal blood pool, myocardium. \par \par LUNGS: No abnormal uptake. \par \par PLEURA/PERICARDIUM: No abnormal uptake. Small bilateral pleural effusions. \par \par THORACIC NODES: No abnormal uptake. \par \par HEPATOBILIARY: Multiple bilobar FDG avid hepatic metastases. For example: \par \par -FDG avid 1.5 cm left hepatic lesion demonstrates max SUV 9.9. \par -FDG avid 1.7 cm right hepatic mass demonstrates max SUV 12.1. \par \par SPLEEN: No abnormal uptake. \par \par PANCREAS: No abnormal uptake. \par \par ADRENAL GLANDS: No abnormal uptake. \par \par KIDNEYS/URETERS/BLADDER: Excreted activity is seen. \par \par ABDOMINOPELVIC NODES: Multiple FDG avid iliac chain lymph nodes, suspicious \par for metastatic disease. For example: \par -FDG avid 1.9 x 1.3 cm left obturator lymph node demonstrates max SUV 8.9. \par -FDG avid 1.3 x 0.9 cm right external iliac lymph node demonstrates max SUV \par 7.5. \par -FDG avid 1.4 x 0.9 cm left internal iliac adenopathy demonstrates max SUV \par 5.3. \par \par \par BOWEL/PERITONEUM/MESENTERY: Several FDG avid peritoneal implants, compatible \par with peritoneal carcinomatosis. For example: \par \par -FDG avid 2.6 x 1.5 cm nodule along the left greater omentum demonstrates \par max SUV 11.4 (image 135). \par -FDG avid 1.3 cm peritoneal implant along the midline greater omentum \par demonstrates max SUV 8.3. \par -Extensive FDG avid omental nodularity within the left upper quadrant, with \par max SUV 9.4 (image 160). \par -FDG avid 1.5 cm right upper quadrant nodularity demonstrates Max SUV 16.0. \par \par PELVIC ORGANS: Intense FDG uptake diffusely involving the uterus and lower \par uterine segment, with max SUV 20.3, compatible with biologic tumor activity. \par \par BONES/SOFT TISSUES: Focal FDG uptake within a left 5th anterolateral rib \par sclerotic lesion, with max SUV 6.2, suspicious for biologic tumor activity. \par \par OTHER FINDINGS: Status post cholecystectomy. Sigmoid diverticulosis. \par Atherosclerotic vascular calcification. \par \par \par IMPRESSION: \par \par 1. Numerous sites of pathologic FDG uptake, compatible with biologic tumor \par activity. \par \par 2. Specifically, intense FDG uptake diffusely involving the uterus and lower \par uterine segment, with max SUV 20.3, compatible with biologic tumor activity. \par \par 3. Multiple FDG avid peritoneal implants and omental nodularity, with max \par SUV 16.0 within a right upper quadrant tumor implant. \par \par 4. Multiple FDG avid pelvic and retroperitoneal lymph nodes, with max SUV \par 8.9 within 1.9 cm left obturator adenopathy. \par \par 5. Multiple bilobar FDG avid hepatic metastases, with max SUV 12.1 within \par the right hepatic lobe. \par \par 6. FDG avid sclerotic lesion within the left 5th anterolateral rib, with max \par SUV 6.2. \par \par \par

## 2019-10-09 ENCOUNTER — APPOINTMENT (OUTPATIENT)
Dept: HEMATOLOGY ONCOLOGY | Facility: CLINIC | Age: 84
End: 2019-10-09
Payer: MEDICARE

## 2019-10-09 ENCOUNTER — LABORATORY RESULT (OUTPATIENT)
Age: 84
End: 2019-10-09

## 2019-10-09 ENCOUNTER — OTHER (OUTPATIENT)
Age: 84
End: 2019-10-09

## 2019-10-09 ENCOUNTER — APPOINTMENT (OUTPATIENT)
Dept: INFUSION THERAPY | Facility: CLINIC | Age: 84
End: 2019-10-09

## 2019-10-09 VITALS
TEMPERATURE: 98.3 F | HEART RATE: 55 BPM | DIASTOLIC BLOOD PRESSURE: 44 MMHG | HEIGHT: 66.5 IN | BODY MASS INDEX: 26.36 KG/M2 | SYSTOLIC BLOOD PRESSURE: 95 MMHG | WEIGHT: 166 LBS

## 2019-10-09 DIAGNOSIS — R19.00 INTRA-ABDOMINAL AND PELVIC SWELLING, MASS AND LUMP, UNSPECIFIED SITE: ICD-10-CM

## 2019-10-09 DIAGNOSIS — K52.1 TOXIC GASTROENTERITIS AND COLITIS: ICD-10-CM

## 2019-10-09 LAB
HCT VFR BLD CALC: 30.4 %
HGB BLD-MCNC: 9.7 G/DL
MCHC RBC-ENTMCNC: 31.9 G/DL
MCHC RBC-ENTMCNC: 32.7 PG
MCV RBC AUTO: 102.4 FL
PLATELET # BLD AUTO: 388 K/UL
PMV BLD: 10.4 FL
RBC # BLD: 2.97 M/UL
RBC # FLD: 12.3 %
WBC # FLD AUTO: 11.53 K/UL

## 2019-10-09 PROCEDURE — 99215 OFFICE O/P EST HI 40 MIN: CPT

## 2019-10-09 RX ORDER — PEGFILGRASTIM-CBQV 6 MG/.6ML
6 INJECTION, SOLUTION SUBCUTANEOUS ONCE
Refills: 0 | Status: COMPLETED | OUTPATIENT
Start: 2019-10-09 | End: 2019-10-09

## 2019-10-09 RX ORDER — DEXAMETHASONE 0.5 MG/5ML
12 ELIXIR ORAL ONCE
Refills: 0 | Status: COMPLETED | OUTPATIENT
Start: 2019-10-09 | End: 2019-10-09

## 2019-10-09 RX ORDER — PACLITAXEL 6 MG/ML
110 INJECTION, SOLUTION, CONCENTRATE INTRAVENOUS ONCE
Refills: 0 | Status: COMPLETED | OUTPATIENT
Start: 2019-10-09 | End: 2019-10-09

## 2019-10-09 RX ORDER — LOPERAMIDE HCL 2 MG
4 TABLET ORAL ONCE
Refills: 0 | Status: COMPLETED | OUTPATIENT
Start: 2019-10-09 | End: 2019-10-09

## 2019-10-09 RX ORDER — CARBOPLATIN 50 MG
155 VIAL (EA) INTRAVENOUS ONCE
Refills: 0 | Status: COMPLETED | OUTPATIENT
Start: 2019-10-09 | End: 2019-10-09

## 2019-10-09 RX ORDER — DIPHENHYDRAMINE HCL 50 MG
50 CAPSULE ORAL ONCE
Refills: 0 | Status: COMPLETED | OUTPATIENT
Start: 2019-10-09 | End: 2019-10-09

## 2019-10-09 RX ORDER — FAMOTIDINE 10 MG/ML
20 INJECTION INTRAVENOUS ONCE
Refills: 0 | Status: COMPLETED | OUTPATIENT
Start: 2019-10-09 | End: 2019-10-09

## 2019-10-09 RX ADMIN — Medication 155 MILLIGRAM(S): at 17:23

## 2019-10-09 RX ADMIN — FAMOTIDINE 20 MILLIGRAM(S): 10 INJECTION INTRAVENOUS at 14:45

## 2019-10-09 RX ADMIN — Medication 50 MILLIGRAM(S): at 14:30

## 2019-10-09 RX ADMIN — Medication 153 MILLIGRAM(S): at 13:56

## 2019-10-09 RX ADMIN — PACLITAXEL 268.33 MILLIGRAM(S): 6 INJECTION, SOLUTION, CONCENTRATE INTRAVENOUS at 14:12

## 2019-10-09 RX ADMIN — FAMOTIDINE 156 MILLIGRAM(S): 10 INJECTION INTRAVENOUS at 13:56

## 2019-10-09 RX ADMIN — Medication 265.5 MILLIGRAM(S): at 16:20

## 2019-10-09 RX ADMIN — Medication 4 MILLIGRAM(S): at 15:00

## 2019-10-09 RX ADMIN — Medication 183 MILLIGRAM(S): at 13:56

## 2019-10-09 RX ADMIN — Medication 12 MILLIGRAM(S): at 14:15

## 2019-10-09 RX ADMIN — PACLITAXEL 110 MILLIGRAM(S): 6 INJECTION, SOLUTION, CONCENTRATE INTRAVENOUS at 16:20

## 2019-10-09 RX ADMIN — PEGFILGRASTIM-CBQV 6 MILLIGRAM(S): 6 INJECTION, SOLUTION SUBCUTANEOUS at 15:51

## 2019-10-09 NOTE — CONSULT LETTER
[Dear  ___] : Dear  [unfilled], [Consult Letter:] : I had the pleasure of evaluating your patient, [unfilled]. [Please see my note below.] : Please see my note below. [Consult Closing:] : Thank you very much for allowing me to participate in the care of this patient.  If you have any questions, please do not hesitate to contact me. [Sincerely,] : Sincerely, [DrAngel  ___] : Dr. BERG [DrAngel ___] : Dr. BERG [FreeTextEntry3] : Keturah Sneed MD\par Attending Physician\par Cancer Services\par [facility]\par

## 2019-10-09 NOTE — HISTORY OF PRESENT ILLNESS
[de-identified] : 84 yof, PMH of CVA with R-sided residual weakness (2017), DM II, HTN, HLD, GERD, rheumatoid arthritis, and osteoarthritis was having lower abdominal pain.  CT abdomen/pelvis showed 3.3 cm endometrial thickening and pelvic LAD.  Patient had never had any postmenopausal bleeding.  She saw gynecologic oncology and underwent exam under anesthesia with dilation and curettage.  Pathology report demonstrated high-grade adenocarcinoma involving cervix and endocervix.  Endometrium could not assessed secondary to complete obstruction by tumor.  Tumor is p16 positive and without MMR deficiency.\par \par At baseline, patient lives alone and uses a walker around the home and a wheelchair outside the home.  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  Her two sons live nearby and will be transporting her to medical appointments. [de-identified] : 9/25/19\par pt is here for follow up.\par She has completed the PET scan and wants to discuss further plan for care.\par She still remains active.\par C/o abdominal pain for which she is on Tramadol as prescribed by her PCP,\par No vaginal bleeding\par \par 10/9/19\par Pt is here for follow up.\par She s/p 1 treatment with carboplatin and taxol.\par She tolerated the treatment well except for 2 episodes of diarrhea.\par pt has been using oxycodone/ acetaminophen 5/325mg 1 tab q 6 hours with relief of symptoms. Requesting another script.\par Denies any significant nausea or vomiting.\par Has poor appetite. Does not like to drink ensure

## 2019-10-09 NOTE — ASSESSMENT
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 left 5th rib.\par \par Pt has an ECOG of 2-3.\par Even though she is on a wheel chair she is able to ambulate with the assistance of a walker and thereby is managing most ADLs.\par \par Pt was started on dose reduced weekly Carboplatin and Taxol\par \par RECOMMENDATIONS\par \par I had a detailed discussion with the patient and her family.  \par \par Pt will continue with chemotherapy with Carboplatin and Taxol.\par Given that she has a borderline performance status  chemotherapy dose will be reduced and given on a weekly schedule.  The side effects from chemotherapy including, but not limited to, nausea, vomiting, diarrhea, myelosuppression, risk of infection, neuropathy, infusion reactions, have all been discussed with the patient and her family.  After 3 cycles of chemotherapy have been completed.  the patient will undergo rescanning to assess response to treatment and further treatment will be decided accordingly.\par The chemotherapy dose was adjusted according to pt's height, weight, labs and anticipated tolerance.\par The high risk of complications and complexity associated with antineoplastic therapy administration has been explained to the pt and family.\par Chemotherapy will be administered under my direct supervision\par \par I discussed management of common side effects with the pt and her family. Advised them about dietary modification and use of imodium.\par Prognosis was also discussed at length.\par All the questions and concerns addressed by the patient and her family were addressed to their satisfaction.\par Patient was also advised to seek consultation with pain management service, and a referral was provided.\par For now she can continue with percocet.\par Side effects discussed

## 2019-10-09 NOTE — PHYSICAL EXAM
[Ambulatory and capable of all self care but unable to carry out any work activities] : Status 2- Ambulatory and capable of all self care but unable to carry out any work activities. Up and about more than 50% of waking hours [Normal] : normoactive bowel sounds, soft and nontender, no hepatosplenomegaly or masses appreciated [de-identified] : sitting in wheelchair [de-identified] : no deformities appreciated [de-identified] : +5/5 strength LLE and LLE and  4-4+ strength RUE and RLE

## 2019-10-09 NOTE — REVIEW OF SYSTEMS
[Abdominal Pain] : abdominal pain [Joint Pain] : joint pain [Negative] : Heme/Lymph [Dysmenorrhea/Abn Vaginal Bleeding] : no dysmenorrhea/abnormal vaginal bleeding [FreeTextEntry9] : diffuse secondary to RA and OA [de-identified] : R-sided residual weakness secondary to CVA

## 2019-10-09 NOTE — REASON FOR VISIT
[Follow-Up Visit] : a follow-up [Family Member] : family member [FreeTextEntry2] : adenocarcinoma of cervix vs. endometrium

## 2019-10-15 ENCOUNTER — OUTPATIENT (OUTPATIENT)
Dept: OUTPATIENT SERVICES | Facility: HOSPITAL | Age: 84
LOS: 1 days | Discharge: HOME | End: 2019-10-15

## 2019-10-15 DIAGNOSIS — Z98.51 TUBAL LIGATION STATUS: Chronic | ICD-10-CM

## 2019-10-15 DIAGNOSIS — Z92.89 PERSONAL HISTORY OF OTHER MEDICAL TREATMENT: Chronic | ICD-10-CM

## 2019-10-15 DIAGNOSIS — Z90.89 ACQUIRED ABSENCE OF OTHER ORGANS: Chronic | ICD-10-CM

## 2019-10-16 ENCOUNTER — OTHER (OUTPATIENT)
Age: 84
End: 2019-10-16

## 2019-10-16 DIAGNOSIS — D68.9 COAGULATION DEFECT, UNSPECIFIED: ICD-10-CM

## 2019-10-16 DIAGNOSIS — Z01.812 ENCOUNTER FOR PREPROCEDURAL LABORATORY EXAMINATION: ICD-10-CM

## 2019-10-16 DIAGNOSIS — D64.9 ANEMIA, UNSPECIFIED: ICD-10-CM

## 2019-10-20 ENCOUNTER — FORM ENCOUNTER (OUTPATIENT)
Age: 84
End: 2019-10-20

## 2019-10-21 ENCOUNTER — OUTPATIENT (OUTPATIENT)
Dept: OUTPATIENT SERVICES | Facility: HOSPITAL | Age: 84
LOS: 1 days | Discharge: HOME | End: 2019-10-21
Payer: MEDICARE

## 2019-10-21 DIAGNOSIS — Z90.89 ACQUIRED ABSENCE OF OTHER ORGANS: Chronic | ICD-10-CM

## 2019-10-21 DIAGNOSIS — Z98.51 TUBAL LIGATION STATUS: Chronic | ICD-10-CM

## 2019-10-21 DIAGNOSIS — Z92.89 PERSONAL HISTORY OF OTHER MEDICAL TREATMENT: Chronic | ICD-10-CM

## 2019-10-21 LAB — GLUCOSE BLDC GLUCOMTR-MCNC: 97 MG/DL — SIGNIFICANT CHANGE UP (ref 70–99)

## 2019-10-21 PROCEDURE — 99152 MOD SED SAME PHYS/QHP 5/>YRS: CPT

## 2019-10-21 PROCEDURE — 77001 FLUOROGUIDE FOR VEIN DEVICE: CPT | Mod: 26

## 2019-10-21 PROCEDURE — 36561 INSERT TUNNELED CV CATH: CPT

## 2019-10-21 PROCEDURE — 76937 US GUIDE VASCULAR ACCESS: CPT | Mod: 26

## 2019-10-21 RX ORDER — CIPROFLOXACIN LACTATE 400MG/40ML
200 VIAL (ML) INTRAVENOUS ONCE
Refills: 0 | Status: DISCONTINUED | OUTPATIENT
Start: 2019-10-21 | End: 2019-11-09

## 2019-10-21 NOTE — PROGRESS NOTE ADULT - SUBJECTIVE AND OBJECTIVE BOX
PRE-OPERATIVE DAY OF PROCEDURE EVALUATION:     I have personally seen and examined this patient. I agree with the history and physical which I have reviewed and noted any changes below: Blood pressure elevated, 200/80 on arrival.  Patient did take her BP meds this morning.  Will re-check and proceed accordingly.    Plan is for chest-port placement with intravenous conscious sedation.    Procedure/ risks/ benefits/ goals/ alternatives were explained. All questions answered. Informed content obtained from patient. Consent placed in chart.

## 2019-10-21 NOTE — PROGRESS NOTE ADULT - SUBJECTIVE AND OBJECTIVE BOX
INTERVENTIONAL RADIOLOGY BRIEF-OPERATIVE NOTE    Procedure:  Chest-port placement with intravenous conscious sedation    Pre-Op Diagnosis:  Metastatic uerine cancer    Post-Op Diagnosis:  Same    Attending:  Dr. Sahu  Resident:   None    Antibiotic prophylaxis:  Cipro, 200mg, ivpb    Anesthesia (type):  [ ] General Anesthesia  [X] Sedation-- Versed, 2mg and Fentanyl, 50mcg, iv  [ ] Spinal Anesthesia  [X] Local/Regional-- 1% Lidocaine, SQ, lower right neck/right upper chest, 8cc;  1% Lidocaine with epinephrine, SQ, lower right neck/right upper chest, 14cc    Total Face-to-Face Sedation Time:  48 minutes     Other medications:  Hydralazine, 10mg iv push x 1 dose;  Heparin, 2000 units to newly placed chest-port system    Contrast:   None    Estimated Blood Loss:  3cc    Condition:   [ ] Critical  [ ] Serious  [ ] Fair   [X] Good    Findings/Follow up Plan of Care:  8-Turkish single lumen chest-port system placed via right internal jugular vein approach with catheter tip in the superior vena cava, at the caval-atrial junction.  The system works well and is ready to use.  The patient tolerated the procedure very well, without incident.    Specimens Removed:  None    Implants:  8-Turkish single lumen Bard Power-Port chest-port system via right chest wall and right internal jugular vein to caval-atrial junction    Complications:  None immediate    Disposition:  For anticipated D/C home.  I discussed the issue of elevated blood pressure with the patient and her two sons, encouraging her to f/u with Dr. Jerez (her PMD) upon discharge today.      Please call Interventional Radiology [phone] with any questions, concerns, or issues.

## 2019-10-23 ENCOUNTER — APPOINTMENT (OUTPATIENT)
Dept: INFUSION THERAPY | Facility: CLINIC | Age: 84
End: 2019-10-23
Payer: MEDICARE

## 2019-10-23 ENCOUNTER — APPOINTMENT (OUTPATIENT)
Dept: HEMATOLOGY ONCOLOGY | Facility: CLINIC | Age: 84
End: 2019-10-23
Payer: MEDICARE

## 2019-10-23 ENCOUNTER — LABORATORY RESULT (OUTPATIENT)
Age: 84
End: 2019-10-23

## 2019-10-23 VITALS
WEIGHT: 159 LBS | SYSTOLIC BLOOD PRESSURE: 90 MMHG | BODY MASS INDEX: 25.28 KG/M2 | DIASTOLIC BLOOD PRESSURE: 48 MMHG | TEMPERATURE: 98 F | HEART RATE: 63 BPM

## 2019-10-23 LAB
HCT VFR BLD CALC: 28.7 %
HGB BLD-MCNC: 8.9 G/DL
MCHC RBC-ENTMCNC: 31 G/DL
MCHC RBC-ENTMCNC: 32.4 PG
MCV RBC AUTO: 104.4 FL
PLATELET # BLD AUTO: 242 K/UL
PMV BLD: 10.3 FL
RBC # BLD: 2.75 M/UL
RBC # FLD: 12.4 %
WBC # FLD AUTO: 16.14 K/UL

## 2019-10-23 PROCEDURE — 99215 OFFICE O/P EST HI 40 MIN: CPT

## 2019-10-23 RX ORDER — DEXAMETHASONE 0.5 MG/5ML
12 ELIXIR ORAL ONCE
Refills: 0 | Status: COMPLETED | OUTPATIENT
Start: 2019-10-23 | End: 2019-10-23

## 2019-10-23 RX ORDER — DIPHENHYDRAMINE HCL 50 MG
50 CAPSULE ORAL ONCE
Refills: 0 | Status: COMPLETED | OUTPATIENT
Start: 2019-10-23 | End: 2019-10-23

## 2019-10-23 RX ORDER — FAMOTIDINE 10 MG/ML
20 INJECTION INTRAVENOUS ONCE
Refills: 0 | Status: COMPLETED | OUTPATIENT
Start: 2019-10-23 | End: 2019-10-23

## 2019-10-23 RX ORDER — PACLITAXEL 6 MG/ML
110 INJECTION, SOLUTION, CONCENTRATE INTRAVENOUS ONCE
Refills: 0 | Status: COMPLETED | OUTPATIENT
Start: 2019-10-23 | End: 2019-10-23

## 2019-10-23 RX ORDER — CARBOPLATIN 50 MG
155 VIAL (EA) INTRAVENOUS ONCE
Refills: 0 | Status: COMPLETED | OUTPATIENT
Start: 2019-10-23 | End: 2019-10-23

## 2019-10-23 RX ADMIN — Medication 12 MILLIGRAM(S): at 15:20

## 2019-10-23 RX ADMIN — Medication 50 MILLIGRAM(S): at 16:00

## 2019-10-23 RX ADMIN — Medication 265.5 MILLIGRAM(S): at 17:15

## 2019-10-23 RX ADMIN — Medication 183 MILLIGRAM(S): at 15:02

## 2019-10-23 RX ADMIN — Medication 155 MILLIGRAM(S): at 18:15

## 2019-10-23 RX ADMIN — Medication 153 MILLIGRAM(S): at 15:40

## 2019-10-23 RX ADMIN — PACLITAXEL 268.33 MILLIGRAM(S): 6 INJECTION, SOLUTION, CONCENTRATE INTRAVENOUS at 15:12

## 2019-10-23 RX ADMIN — FAMOTIDINE 20 MILLIGRAM(S): 10 INJECTION INTRAVENOUS at 15:40

## 2019-10-23 RX ADMIN — FAMOTIDINE 156 MILLIGRAM(S): 10 INJECTION INTRAVENOUS at 15:20

## 2019-10-23 RX ADMIN — PACLITAXEL 110 MILLIGRAM(S): 6 INJECTION, SOLUTION, CONCENTRATE INTRAVENOUS at 17:15

## 2019-10-23 NOTE — PHYSICAL EXAM
[Ambulatory and capable of all self care but unable to carry out any work activities] : Status 2- Ambulatory and capable of all self care but unable to carry out any work activities. Up and about more than 50% of waking hours [Normal] : no peripheral adenopathy appreciated [de-identified] : sitting in wheelchair [de-identified] : no deformities appreciated [de-identified] : approx 7 cm red patch on left paraspinal area in the lumbar region [de-identified] : +5/5 strength LLE and LLE and  4-4+ strength RUE and RLE

## 2019-10-23 NOTE — CONSULT LETTER
[Dear  ___] : Dear  [unfilled], [Consult Letter:] : I had the pleasure of evaluating your patient, [unfilled]. [Please see my note below.] : Please see my note below. [Consult Closing:] : Thank you very much for allowing me to participate in the care of this patient.  If you have any questions, please do not hesitate to contact me. [Sincerely,] : Sincerely, [DrAngel  ___] : Dr. BERG [DrAngel ___] : Dr. BERG [FreeTextEntry3] : Keturah Sneed MD\par Attending Physician\par Cancer Services\par [facility]\par

## 2019-10-23 NOTE — ASSESSMENT
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 left 5th rib.\par \par Pt has an ECOG of 2-3.\par Even though she is on a wheel chair she is able to ambulate with the assistance of a walker and thereby is managing most ADLs.\par \par Pt was started on dose reduced weekly Carboplatin and Taxol\par \par RECOMMENDATIONS\par \par I had a detailed discussion with the patient and her family.  \par \par Pt will continue with chemotherapy with Carboplatin and Taxol. proceed with Cycle #3\par Given that she has a borderline performance status  chemotherapy dose will be reduced and given on a weekly schedule.  The side effects from chemotherapy including, but not limited to, nausea, vomiting, diarrhea, myelosuppression, risk of infection, neuropathy, infusion reactions, have all been discussed with the patient and her family.  After 3 cycles of chemotherapy have been completed.  the patient will undergo rescanning to assess response to treatment and further treatment will be decided accordingly.\par The chemotherapy dose was adjusted according to pt's height, weight, labs and anticipated tolerance.\par The high risk of complications and complexity associated with antineoplastic therapy administration has been explained to the pt and family.\par Chemotherapy will be administered under my direct supervision\par \par I discussed management of common side effects with the pt and her family. Advised them about dietary modification and use of stool softeners. Advised to use MOM as needed\par Prognosis was also discussed at length.\par All the questions and concerns addressed by the patient and her family were addressed to their satisfaction.\par Patient was again advised to seek consultation with pain management service, and a referral was provided.\par For now she can continue with percocet.\par Side effects discussed.\par Pt and family made aware of high risk of falls in an elderly pt who is frail especially with opiods.\par  eval for home care.

## 2019-10-23 NOTE — OB HISTORY
[Menarche Age: ____] : age at menarche was [unfilled] [Menopause Age: ____] : age at menopause was [unfilled] [Currently In Menopause] : currently in menopause [___] :  Spontaneous: [unfilled]

## 2019-10-23 NOTE — HISTORY OF PRESENT ILLNESS
[de-identified] : 84 yof, PMH of CVA with R-sided residual weakness (2017), DM II, HTN, HLD, GERD, rheumatoid arthritis, and osteoarthritis was having lower abdominal pain.  CT abdomen/pelvis showed 3.3 cm endometrial thickening and pelvic LAD.  Patient had never had any postmenopausal bleeding.  She saw gynecologic oncology and underwent exam under anesthesia with dilation and curettage.  Pathology report demonstrated high-grade adenocarcinoma involving cervix and endocervix.  Endometrium could not assessed secondary to complete obstruction by tumor.  Tumor is p16 positive and without MMR deficiency.\par \par At baseline, patient lives alone and uses a walker around the home and a wheelchair outside the home.  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  Her two sons live nearby and will be transporting her to medical appointments. [de-identified] : 9/25/19\par pt is here for follow up.\par She has completed the PET scan and wants to discuss further plan for care.\par She still remains active.\par C/o abdominal pain for which she is on Tramadol as prescribed by her PCP,\par No vaginal bleeding\par \par 10/9/19\par Pt is here for follow up.\par She s/p 1 treatment with carboplatin and taxol.\par She tolerated the treatment well except for 2 episodes of diarrhea.\par pt has been using oxycodone/ acetaminophen 5/325mg 1 tab q 6 hours with relief of symptoms. Requesting another script.\par Denies any significant nausea or vomiting.\par Has poor appetite. Does not like to drink ensure\par \par 10/23/19\par Pt is here for follow up.\par Since last visit she had port placement after which she had an episode of vomiting.\par Her abdominal pain has not changed.\par She has been having constipation X 7 days.\par She states she took a stool softener x 3 times without relief.\par She is able to pass flatus.\par appetite is not improving.\par She has been using oxycodone 5 mg as needed with relief.

## 2019-10-24 DIAGNOSIS — Z88.0 ALLERGY STATUS TO PENICILLIN: ICD-10-CM

## 2019-10-24 DIAGNOSIS — C79.82 SECONDARY MALIGNANT NEOPLASM OF GENITAL ORGANS: ICD-10-CM

## 2019-10-24 DIAGNOSIS — I10 ESSENTIAL (PRIMARY) HYPERTENSION: ICD-10-CM

## 2019-10-24 DIAGNOSIS — E78.00 PURE HYPERCHOLESTEROLEMIA, UNSPECIFIED: ICD-10-CM

## 2019-10-24 DIAGNOSIS — F32.9 MAJOR DEPRESSIVE DISORDER, SINGLE EPISODE, UNSPECIFIED: ICD-10-CM

## 2019-10-24 DIAGNOSIS — F41.9 ANXIETY DISORDER, UNSPECIFIED: ICD-10-CM

## 2019-10-24 DIAGNOSIS — I69.351 HEMIPLEGIA AND HEMIPARESIS FOLLOWING CEREBRAL INFARCTION AFFECTING RIGHT DOMINANT SIDE: ICD-10-CM

## 2019-10-28 ENCOUNTER — RX RENEWAL (OUTPATIENT)
Age: 84
End: 2019-10-28

## 2019-10-28 LAB
ALBUMIN SERPL ELPH-MCNC: 3 G/DL
ALP BLD-CCNC: 119 U/L
ALT SERPL-CCNC: <5 U/L
ANION GAP SERPL CALC-SCNC: 16 MMOL/L
AST SERPL-CCNC: 11 U/L
BILIRUB SERPL-MCNC: 0.3 MG/DL
BUN SERPL-MCNC: 14 MG/DL
CALCIUM SERPL-MCNC: 8.4 MG/DL
CANCER AG15-3 SERPL-ACNC: 49.3 U/ML
CHLORIDE SERPL-SCNC: 102 MMOL/L
CO2 SERPL-SCNC: 25 MMOL/L
CREAT SERPL-MCNC: 1 MG/DL
FOLATE SERPL-MCNC: >20 NG/ML
GLUCOSE SERPL-MCNC: 89 MG/DL
POTASSIUM SERPL-SCNC: 4.3 MMOL/L
PROT SERPL-MCNC: 5.7 G/DL
SODIUM SERPL-SCNC: 143 MMOL/L
T4 FREE SERPL-MCNC: 1.2 NG/DL
VIT B12 SERPL-MCNC: 1590 PG/ML

## 2019-10-28 RX ORDER — OXYCODONE AND ACETAMINOPHEN 5; 325 MG/1; MG/1
5-325 TABLET ORAL
Qty: 20 | Refills: 0 | Status: ACTIVE | COMMUNITY
Start: 2019-10-09 | End: 1900-01-01

## 2019-10-30 ENCOUNTER — LABORATORY RESULT (OUTPATIENT)
Age: 84
End: 2019-10-30

## 2019-10-30 ENCOUNTER — APPOINTMENT (OUTPATIENT)
Dept: INFUSION THERAPY | Facility: CLINIC | Age: 84
End: 2019-10-30

## 2019-10-30 RX ORDER — DIPHENHYDRAMINE HCL 50 MG
50 CAPSULE ORAL ONCE
Refills: 0 | Status: COMPLETED | OUTPATIENT
Start: 2019-10-30 | End: 2019-10-30

## 2019-10-30 RX ORDER — FAMOTIDINE 10 MG/ML
20 INJECTION INTRAVENOUS ONCE
Refills: 0 | Status: COMPLETED | OUTPATIENT
Start: 2019-10-30 | End: 2019-10-30

## 2019-10-30 RX ORDER — DEXAMETHASONE 0.5 MG/5ML
12 ELIXIR ORAL ONCE
Refills: 0 | Status: COMPLETED | OUTPATIENT
Start: 2019-10-30 | End: 2019-10-30

## 2019-10-30 RX ORDER — PACLITAXEL 6 MG/ML
110 INJECTION, SOLUTION, CONCENTRATE INTRAVENOUS ONCE
Refills: 0 | Status: COMPLETED | OUTPATIENT
Start: 2019-10-30 | End: 2019-10-30

## 2019-10-30 RX ORDER — CARBOPLATIN 50 MG
155 VIAL (EA) INTRAVENOUS ONCE
Refills: 0 | Status: COMPLETED | OUTPATIENT
Start: 2019-10-30 | End: 2019-10-30

## 2019-10-30 RX ORDER — PEGFILGRASTIM-CBQV 6 MG/.6ML
6 INJECTION, SOLUTION SUBCUTANEOUS ONCE
Refills: 0 | Status: COMPLETED | OUTPATIENT
Start: 2019-10-30 | End: 2019-10-30

## 2019-10-30 RX ADMIN — PACLITAXEL 268.33 MILLIGRAM(S): 6 INJECTION, SOLUTION, CONCENTRATE INTRAVENOUS at 10:30

## 2019-10-30 RX ADMIN — Medication 12 MILLIGRAM(S): at 09:30

## 2019-10-30 RX ADMIN — Medication 183 MILLIGRAM(S): at 09:08

## 2019-10-30 RX ADMIN — Medication 265.5 MILLIGRAM(S): at 12:15

## 2019-10-30 RX ADMIN — Medication 153 MILLIGRAM(S): at 09:30

## 2019-10-30 RX ADMIN — FAMOTIDINE 20 MILLIGRAM(S): 10 INJECTION INTRAVENOUS at 10:30

## 2019-10-30 RX ADMIN — Medication 50 MILLIGRAM(S): at 10:00

## 2019-10-30 RX ADMIN — FAMOTIDINE 156 MILLIGRAM(S): 10 INJECTION INTRAVENOUS at 10:00

## 2019-10-30 RX ADMIN — Medication 155 MILLIGRAM(S): at 13:15

## 2019-10-30 RX ADMIN — PEGFILGRASTIM-CBQV 6 MILLIGRAM(S): 6 INJECTION, SOLUTION SUBCUTANEOUS at 12:00

## 2019-10-30 RX ADMIN — PACLITAXEL 110 MILLIGRAM(S): 6 INJECTION, SOLUTION, CONCENTRATE INTRAVENOUS at 12:15

## 2019-10-31 LAB
HCT VFR BLD CALC: 28.4 %
HGB BLD-MCNC: 9 G/DL
MCHC RBC-ENTMCNC: 31.7 G/DL
MCHC RBC-ENTMCNC: 32.1 PG
MCV RBC AUTO: 101.4 FL
PLATELET # BLD AUTO: 313 K/UL
PMV BLD: 10.6 FL
RBC # BLD: 2.8 M/UL
RBC # FLD: 11.8 %
WBC # FLD AUTO: 7.12 K/UL

## 2019-11-13 ENCOUNTER — LABORATORY RESULT (OUTPATIENT)
Age: 84
End: 2019-11-13

## 2019-11-13 ENCOUNTER — APPOINTMENT (OUTPATIENT)
Dept: HEMATOLOGY ONCOLOGY | Facility: CLINIC | Age: 84
End: 2019-11-13
Payer: MEDICARE

## 2019-11-13 ENCOUNTER — APPOINTMENT (OUTPATIENT)
Dept: INFUSION THERAPY | Facility: CLINIC | Age: 84
End: 2019-11-13
Payer: MEDICARE

## 2019-11-13 VITALS
BODY MASS INDEX: 23.82 KG/M2 | HEIGHT: 66.5 IN | HEART RATE: 98 BPM | SYSTOLIC BLOOD PRESSURE: 173 MMHG | DIASTOLIC BLOOD PRESSURE: 103 MMHG | TEMPERATURE: 97.9 F | WEIGHT: 150 LBS

## 2019-11-13 LAB
HCT VFR BLD CALC: 27.4 %
HGB BLD-MCNC: 8.8 G/DL
MCHC RBC-ENTMCNC: 32.1 G/DL
MCHC RBC-ENTMCNC: 33 PG
MCV RBC AUTO: 102.6 FL
PLATELET # BLD AUTO: 351 K/UL
PMV BLD: 10.5 FL
RBC # BLD: 2.67 M/UL
RBC # FLD: 13.3 %
WBC # FLD AUTO: 20.44 K/UL

## 2019-11-13 PROCEDURE — 99215 OFFICE O/P EST HI 40 MIN: CPT

## 2019-11-13 RX ORDER — CARBOPLATIN 50 MG
155 VIAL (EA) INTRAVENOUS ONCE
Refills: 0 | Status: COMPLETED | OUTPATIENT
Start: 2019-11-13 | End: 2019-11-13

## 2019-11-13 RX ORDER — DEXAMETHASONE 0.5 MG/5ML
12 ELIXIR ORAL ONCE
Refills: 0 | Status: COMPLETED | OUTPATIENT
Start: 2019-11-13 | End: 2019-11-13

## 2019-11-13 RX ORDER — PACLITAXEL 6 MG/ML
110 INJECTION, SOLUTION, CONCENTRATE INTRAVENOUS ONCE
Refills: 0 | Status: COMPLETED | OUTPATIENT
Start: 2019-11-13 | End: 2019-11-13

## 2019-11-13 RX ORDER — DIPHENHYDRAMINE HCL 50 MG
50 CAPSULE ORAL ONCE
Refills: 0 | Status: COMPLETED | OUTPATIENT
Start: 2019-11-13 | End: 2019-11-13

## 2019-11-13 RX ORDER — FAMOTIDINE 10 MG/ML
20 INJECTION INTRAVENOUS ONCE
Refills: 0 | Status: COMPLETED | OUTPATIENT
Start: 2019-11-13 | End: 2019-11-13

## 2019-11-13 RX ADMIN — Medication 183 MILLIGRAM(S): at 15:35

## 2019-11-13 RX ADMIN — Medication 50 MILLIGRAM(S): at 16:35

## 2019-11-13 RX ADMIN — FAMOTIDINE 156 MILLIGRAM(S): 10 INJECTION INTRAVENOUS at 15:55

## 2019-11-13 RX ADMIN — Medication 155 MILLIGRAM(S): at 18:30

## 2019-11-13 RX ADMIN — Medication 153 MILLIGRAM(S): at 16:15

## 2019-11-13 RX ADMIN — FAMOTIDINE 20 MILLIGRAM(S): 10 INJECTION INTRAVENOUS at 16:15

## 2019-11-13 RX ADMIN — Medication 265.5 MILLIGRAM(S): at 16:01

## 2019-11-13 RX ADMIN — PACLITAXEL 268.33 MILLIGRAM(S): 6 INJECTION, SOLUTION, CONCENTRATE INTRAVENOUS at 16:01

## 2019-11-13 RX ADMIN — PACLITAXEL 110 MILLIGRAM(S): 6 INJECTION, SOLUTION, CONCENTRATE INTRAVENOUS at 17:40

## 2019-11-13 RX ADMIN — Medication 12 MILLIGRAM(S): at 15:55

## 2019-11-14 LAB
ALBUMIN SERPL ELPH-MCNC: 3.2 G/DL
ALP BLD-CCNC: 188 U/L
ALT SERPL-CCNC: <5 U/L
ANION GAP SERPL CALC-SCNC: 16 MMOL/L
AST SERPL-CCNC: 9 U/L
BILIRUB SERPL-MCNC: 0.2 MG/DL
BUN SERPL-MCNC: 16 MG/DL
CALCIUM SERPL-MCNC: 8.2 MG/DL
CANCER AG125 SERPL-ACNC: 71 U/ML
CHLORIDE SERPL-SCNC: 101 MMOL/L
CO2 SERPL-SCNC: 26 MMOL/L
CREAT SERPL-MCNC: 1 MG/DL
GLUCOSE SERPL-MCNC: 101 MG/DL
POTASSIUM SERPL-SCNC: 3.4 MMOL/L
PROT SERPL-MCNC: 6 G/DL
SODIUM SERPL-SCNC: 143 MMOL/L

## 2019-11-20 ENCOUNTER — LABORATORY RESULT (OUTPATIENT)
Age: 84
End: 2019-11-20

## 2019-11-20 ENCOUNTER — APPOINTMENT (OUTPATIENT)
Dept: INFUSION THERAPY | Facility: CLINIC | Age: 84
End: 2019-11-20

## 2019-11-20 RX ORDER — PACLITAXEL 6 MG/ML
110 INJECTION, SOLUTION, CONCENTRATE INTRAVENOUS ONCE
Refills: 0 | Status: COMPLETED | OUTPATIENT
Start: 2019-11-20 | End: 2019-11-20

## 2019-11-20 RX ORDER — PEGFILGRASTIM-CBQV 6 MG/.6ML
6 INJECTION, SOLUTION SUBCUTANEOUS ONCE
Refills: 0 | Status: COMPLETED | OUTPATIENT
Start: 2019-11-20 | End: 2019-11-20

## 2019-11-20 RX ORDER — DEXAMETHASONE 0.5 MG/5ML
12 ELIXIR ORAL ONCE
Refills: 0 | Status: COMPLETED | OUTPATIENT
Start: 2019-11-20 | End: 2019-11-20

## 2019-11-20 RX ORDER — FAMOTIDINE 10 MG/ML
20 INJECTION INTRAVENOUS ONCE
Refills: 0 | Status: COMPLETED | OUTPATIENT
Start: 2019-11-20 | End: 2019-11-20

## 2019-11-20 RX ORDER — CARBOPLATIN 50 MG
155 VIAL (EA) INTRAVENOUS ONCE
Refills: 0 | Status: COMPLETED | OUTPATIENT
Start: 2019-11-20 | End: 2019-11-20

## 2019-11-20 RX ORDER — DIPHENHYDRAMINE HCL 50 MG
50 CAPSULE ORAL ONCE
Refills: 0 | Status: COMPLETED | OUTPATIENT
Start: 2019-11-20 | End: 2019-11-20

## 2019-11-20 RX ADMIN — PEGFILGRASTIM-CBQV 6 MILLIGRAM(S): 6 INJECTION, SOLUTION SUBCUTANEOUS at 14:23

## 2019-11-20 RX ADMIN — Medication 153 MILLIGRAM(S): at 15:00

## 2019-11-20 RX ADMIN — Medication 155 MILLIGRAM(S): at 17:00

## 2019-11-20 RX ADMIN — Medication 50 MILLIGRAM(S): at 15:20

## 2019-11-20 RX ADMIN — Medication 183 MILLIGRAM(S): at 14:22

## 2019-11-20 RX ADMIN — Medication 265.5 MILLIGRAM(S): at 16:30

## 2019-11-20 RX ADMIN — Medication 12 MILLIGRAM(S): at 14:40

## 2019-11-20 RX ADMIN — PACLITAXEL 110 MILLIGRAM(S): 6 INJECTION, SOLUTION, CONCENTRATE INTRAVENOUS at 16:15

## 2019-11-20 RX ADMIN — PACLITAXEL 268.33 MILLIGRAM(S): 6 INJECTION, SOLUTION, CONCENTRATE INTRAVENOUS at 15:14

## 2019-11-20 RX ADMIN — FAMOTIDINE 20 MILLIGRAM(S): 10 INJECTION INTRAVENOUS at 15:00

## 2019-11-20 RX ADMIN — FAMOTIDINE 156 MILLIGRAM(S): 10 INJECTION INTRAVENOUS at 14:40

## 2019-11-24 LAB
HCT VFR BLD CALC: 24.5 %
HGB BLD-MCNC: 7.8 G/DL
MCHC RBC-ENTMCNC: 31.8 G/DL
MCHC RBC-ENTMCNC: 32.4 PG
MCV RBC AUTO: 101.7 FL
PLATELET # BLD AUTO: 278 K/UL
PMV BLD: 11 FL
RBC # BLD: 2.41 M/UL
RBC # FLD: 13.1 %
WBC # FLD AUTO: 8.86 K/UL

## 2019-11-24 NOTE — REVIEW OF SYSTEMS
[Abdominal Pain] : abdominal pain [Joint Pain] : joint pain [Negative] : Heme/Lymph [Dysmenorrhea/Abn Vaginal Bleeding] : no dysmenorrhea/abnormal vaginal bleeding [FreeTextEntry9] : diffuse secondary to RA and OA [de-identified] : R-sided residual weakness secondary to CVA

## 2019-11-24 NOTE — ASSESSMENT
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 left 5th rib.\par \par Pt has an ECOG of 2-3.\par Even though she is on a wheel chair she is able to ambulate with the assistance of a walker and thereby is managing most ADLs.\par \par Pt was started on dose reduced weekly Carboplatin and Taxol\par \par RECOMMENDATIONS\par \par I had a detailed discussion with the patient and her family.  \par \par Pt will continue with chemotherapy with Carboplatin and Taxol. proceed with Cycle #3\par Given that she has a borderline performance status  chemotherapy dose will be reduced and given on a weekly schedule.  The side effects from chemotherapy including, but not limited to, nausea, vomiting, diarrhea, myelosuppression, risk of infection, neuropathy, infusion reactions, have all been discussed with the patient and her family.  After 3 cycles of chemotherapy have been completed.  the patient will undergo rescanning to assess response to treatment and further treatment will be decided accordingly.\par The chemotherapy dose was adjusted according to pt's height, weight, labs and anticipated tolerance.\par The high risk of complications and complexity associated with antineoplastic therapy administration has been explained to the pt and family.\par Chemotherapy will be administered under my direct supervision\par \par I discussed management of common side effects with the pt and her family. Advised them about dietary modification and use of stool softeners. Advised to use MOM as needed\par Prognosis was also discussed at length.\par All the questions and concerns addressed by the patient and her family were addressed to their satisfaction.\par Patient will follow with pain management service.\par \par Pt and family made aware of high risk of falls in an elderly pt who is frail especially with opioids.\par  eval for home care.\par PET scan after this cycle is completed.\par Encouraged pt to improve nutrition.

## 2019-11-24 NOTE — HISTORY OF PRESENT ILLNESS
[de-identified] : 84 yof, PMH of CVA with R-sided residual weakness (2017), DM II, HTN, HLD, GERD, rheumatoid arthritis, and osteoarthritis was having lower abdominal pain.  CT abdomen/pelvis showed 3.3 cm endometrial thickening and pelvic LAD.  Patient had never had any postmenopausal bleeding.  She saw gynecologic oncology and underwent exam under anesthesia with dilation and curettage.  Pathology report demonstrated high-grade adenocarcinoma involving cervix and endocervix.  Endometrium could not assessed secondary to complete obstruction by tumor.  Tumor is p16 positive and without MMR deficiency.\par \par At baseline, patient lives alone and uses a walker around the home and a wheelchair outside the home.  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  Her two sons live nearby and will be transporting her to medical appointments. [de-identified] : 9/25/19\par pt is here for follow up.\par She has completed the PET scan and wants to discuss further plan for care.\par She still remains active.\par C/o abdominal pain for which she is on Tramadol as prescribed by her PCP,\par No vaginal bleeding\par \par 10/9/19\par Pt is here for follow up.\par She s/p 1 treatment with carboplatin and taxol.\par She tolerated the treatment well except for 2 episodes of diarrhea.\par pt has been using oxycodone/ acetaminophen 5/325mg 1 tab q 6 hours with relief of symptoms. Requesting another script.\par Denies any significant nausea or vomiting.\par Has poor appetite. Does not like to drink ensure\par \par 10/23/19\par Pt is here for follow up.\par Since last visit she had port placement after which she had an episode of vomiting.\par Her abdominal pain has not changed.\par She has been having constipation X 7 days.\par She states she took a stool softener x 3 times without relief.\par She is able to pass flatus.\par appetite is not improving.\par She has been using oxycodone 5 mg as needed with relief.\par \par 11/13/19\par Pt is here for follow up.\par States the pain is well controlled. She saw a pain Mgt specialist and she is on oxycodone and marinol. The appetite is sometimes improved.\par However she has lost 9 lb since last visit.\par She has a home aide 3 times a week. There is some family support.\par No N, V, D\par

## 2019-11-26 ENCOUNTER — OUTPATIENT (OUTPATIENT)
Dept: OUTPATIENT SERVICES | Facility: HOSPITAL | Age: 84
LOS: 1 days | Discharge: HOME | End: 2019-11-26
Payer: MEDICARE

## 2019-11-26 DIAGNOSIS — Z92.89 PERSONAL HISTORY OF OTHER MEDICAL TREATMENT: Chronic | ICD-10-CM

## 2019-11-26 DIAGNOSIS — Z90.89 ACQUIRED ABSENCE OF OTHER ORGANS: Chronic | ICD-10-CM

## 2019-11-26 DIAGNOSIS — Z98.51 TUBAL LIGATION STATUS: Chronic | ICD-10-CM

## 2019-11-26 DIAGNOSIS — C54.1 MALIGNANT NEOPLASM OF ENDOMETRIUM: ICD-10-CM

## 2019-11-26 LAB — GLUCOSE BLDC GLUCOMTR-MCNC: 119 MG/DL — HIGH (ref 70–99)

## 2019-11-26 PROCEDURE — 78815 PET IMAGE W/CT SKULL-THIGH: CPT | Mod: 26,PS

## 2019-12-04 ENCOUNTER — OUTPATIENT (OUTPATIENT)
Dept: OUTPATIENT SERVICES | Facility: HOSPITAL | Age: 84
LOS: 1 days | Discharge: HOME | End: 2019-12-04
Payer: MEDICARE

## 2019-12-04 ENCOUNTER — LABORATORY RESULT (OUTPATIENT)
Age: 84
End: 2019-12-04

## 2019-12-04 ENCOUNTER — APPOINTMENT (OUTPATIENT)
Dept: INFUSION THERAPY | Facility: CLINIC | Age: 84
End: 2019-12-04
Payer: MEDICARE

## 2019-12-04 ENCOUNTER — APPOINTMENT (OUTPATIENT)
Dept: HEMATOLOGY ONCOLOGY | Facility: CLINIC | Age: 84
End: 2019-12-04
Payer: MEDICARE

## 2019-12-04 VITALS
SYSTOLIC BLOOD PRESSURE: 145 MMHG | DIASTOLIC BLOOD PRESSURE: 68 MMHG | WEIGHT: 145 LBS | HEART RATE: 59 BPM | TEMPERATURE: 97.8 F | BODY MASS INDEX: 23.05 KG/M2

## 2019-12-04 DIAGNOSIS — Z92.89 PERSONAL HISTORY OF OTHER MEDICAL TREATMENT: Chronic | ICD-10-CM

## 2019-12-04 DIAGNOSIS — Z98.51 TUBAL LIGATION STATUS: Chronic | ICD-10-CM

## 2019-12-04 DIAGNOSIS — Z90.89 ACQUIRED ABSENCE OF OTHER ORGANS: Chronic | ICD-10-CM

## 2019-12-04 LAB
HCT VFR BLD CALC: 25.3 %
HGB BLD-MCNC: 7.9 G/DL
MCHC RBC-ENTMCNC: 31.2 G/DL
MCHC RBC-ENTMCNC: 32.9 PG
MCV RBC AUTO: 105.4 FL
PLATELET # BLD AUTO: 264 K/UL
PMV BLD: 10.7 FL
RBC # BLD: 2.4 M/UL
RBC # FLD: 15.2 %
RETICS # AUTO: 3.2 %
RETICS AGGREG/RBC NFR: 77 K/UL
WBC # FLD AUTO: 8.37 K/UL

## 2019-12-04 PROCEDURE — 99202 OFFICE O/P NEW SF 15 MIN: CPT

## 2019-12-04 PROCEDURE — 99215 OFFICE O/P EST HI 40 MIN: CPT

## 2019-12-04 RX ORDER — PACLITAXEL 6 MG/ML
110 INJECTION, SOLUTION, CONCENTRATE INTRAVENOUS ONCE
Refills: 0 | Status: COMPLETED | OUTPATIENT
Start: 2019-12-04 | End: 2019-12-04

## 2019-12-04 RX ORDER — DRONABINOL 5 MG/1
5 CAPSULE ORAL
Qty: 60 | Refills: 2 | Status: ACTIVE | COMMUNITY
Start: 2019-12-04 | End: 1900-01-01

## 2019-12-04 RX ORDER — DIPHENHYDRAMINE HCL 50 MG
50 CAPSULE ORAL ONCE
Refills: 0 | Status: COMPLETED | OUTPATIENT
Start: 2019-12-04 | End: 2019-12-04

## 2019-12-04 RX ORDER — DEXAMETHASONE 0.5 MG/5ML
12 ELIXIR ORAL ONCE
Refills: 0 | Status: COMPLETED | OUTPATIENT
Start: 2019-12-04 | End: 2019-12-04

## 2019-12-04 RX ORDER — FAMOTIDINE 10 MG/ML
20 INJECTION INTRAVENOUS ONCE
Refills: 0 | Status: COMPLETED | OUTPATIENT
Start: 2019-12-04 | End: 2019-12-04

## 2019-12-04 RX ORDER — CARBOPLATIN 50 MG
155 VIAL (EA) INTRAVENOUS ONCE
Refills: 0 | Status: COMPLETED | OUTPATIENT
Start: 2019-12-04 | End: 2019-12-04

## 2019-12-04 RX ADMIN — Medication 265.5 MILLIGRAM(S): at 14:24

## 2019-12-04 RX ADMIN — Medication 50 MILLIGRAM(S): at 14:40

## 2019-12-04 RX ADMIN — PACLITAXEL 268.33 MILLIGRAM(S): 6 INJECTION, SOLUTION, CONCENTRATE INTRAVENOUS at 14:25

## 2019-12-04 RX ADMIN — FAMOTIDINE 156 MILLIGRAM(S): 10 INJECTION INTRAVENOUS at 14:01

## 2019-12-04 RX ADMIN — Medication 12 MILLIGRAM(S): at 14:20

## 2019-12-04 RX ADMIN — Medication 183 MILLIGRAM(S): at 14:01

## 2019-12-04 RX ADMIN — FAMOTIDINE 20 MILLIGRAM(S): 10 INJECTION INTRAVENOUS at 15:00

## 2019-12-04 RX ADMIN — Medication 153 MILLIGRAM(S): at 14:01

## 2019-12-04 NOTE — CONSULT LETTER
[Dear  ___] : Dear  [unfilled], [Consult Letter:] : I had the pleasure of evaluating your patient, [unfilled]. [Please see my note below.] : Please see my note below. [Consult Closing:] : Thank you very much for allowing me to participate in the care of this patient.  If you have any questions, please do not hesitate to contact me. [Sincerely,] : Sincerely, [DrAngel  ___] : Dr. BERG [DrAngel ___] : Dr. BERG [FreeTextEntry3] : Keturah Sneed MD\par Attending Physician\par Cancer Services\par [facility]\par

## 2019-12-04 NOTE — REVIEW OF SYSTEMS
[Abdominal Pain] : abdominal pain [Joint Pain] : joint pain [Negative] : Heme/Lymph [de-identified] : R-sided residual weakness secondary to CVA [FreeTextEntry9] : diffuse secondary to RA and OA [Dysmenorrhea/Abn Vaginal Bleeding] : no dysmenorrhea/abnormal vaginal bleeding

## 2019-12-04 NOTE — HISTORY OF PRESENT ILLNESS
[de-identified] : 9/25/19\par pt is here for follow up.\par She has completed the PET scan and wants to discuss further plan for care.\par She still remains active.\par C/o abdominal pain for which she is on Tramadol as prescribed by her PCP,\par No vaginal bleeding\par \par 10/9/19\par Pt is here for follow up.\par She s/p 1 treatment with carboplatin and taxol.\par She tolerated the treatment well except for 2 episodes of diarrhea.\par pt has been using oxycodone/ acetaminophen 5/325mg 1 tab q 6 hours with relief of symptoms. Requesting another script.\par Denies any significant nausea or vomiting.\par Has poor appetite. Does not like to drink ensure\par \par 10/23/19\par Pt is here for follow up.\par Since last visit she had port placement after which she had an episode of vomiting.\par Her abdominal pain has not changed.\par She has been having constipation X 7 days.\par She states she took a stool softener x 3 times without relief.\par She is able to pass flatus.\par appetite is not improving.\par She has been using oxycodone 5 mg as needed with relief.\par \par 11/13/19\par Pt is here for follow up.\par States the pain is well controlled. She saw a pain Mgt specialist and she is on oxycodone and marinol. The appetite is sometimes improved.\par However she has lost 9 lb since last visit.\par She has a home aide 3 times a week. There is some family support.\par No N, V, D\par \par 12/4/19\par pt is here for follow up.\par Saw Dr Lopez today and may be switching to Fentanyl. \par States that she has decreased appetite, she has not been taking marinol daily.\par She has been having diarrhea, did not take imodium as advised.\par No fever.\par has mild nausea and few episodes of vomiting.\par She has vaginal spotting off and on.\par  [de-identified] : 84 yof, PMH of CVA with R-sided residual weakness (2017), DM II, HTN, HLD, GERD, rheumatoid arthritis, and osteoarthritis was having lower abdominal pain.  CT abdomen/pelvis showed 3.3 cm endometrial thickening and pelvic LAD.  Patient had never had any postmenopausal bleeding.  She saw gynecologic oncology and underwent exam under anesthesia with dilation and curettage.  Pathology report demonstrated high-grade adenocarcinoma involving cervix and endocervix.  Endometrium could not assessed secondary to complete obstruction by tumor.  Tumor is p16 positive and without MMR deficiency.\par \par At baseline, patient lives alone and uses a walker around the home and a wheelchair outside the home.  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  Her two sons live nearby and will be transporting her to medical appointments.

## 2019-12-04 NOTE — ASSESSMENT
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 left 5th rib.\par \par Pt has an ECOG of 2-3.\par Even though she is on a wheel chair she is able to ambulate with the assistance of a walker and thereby is managing most ADLs.\par \par Pt was started on dose reduced weekly Carboplatin and Taxol s/p 3 cycles with interim PET scan showing a favorable response.\par \par Anemia secondary to myelosuppression : will send w/u today\par decreased appetite : start marinol\par \par RECOMMENDATIONS\par \par I had a detailed discussion with the patient and her family.  \par \par Pt will continue with chemotherapy with Carboplatin and Taxol. proceed with Cycle # 4\par Given that she has a borderline performance status  chemotherapy dose will be reduced and given on a weekly schedule.  The side effects from chemotherapy including, but not limited to, nausea, vomiting, diarrhea, myelosuppression, risk of infection, neuropathy, infusion reactions, have all been discussed with the patient and her family. \par \par The chemotherapy dose was adjusted according to pt's height, weight, labs and anticipated tolerance.\par The high risk of complications and complexity associated with antineoplastic therapy administration has been explained to the pt and family.\par Chemotherapy will be administered under my direct supervision\par \par I discussed management of common side effects with the pt and her family. Advised them about dietary modification and use of stool softeners. Advised to use MOM as needed\par Prognosis was also discussed at length.\par All the questions and concerns addressed by the patient and her family were addressed to their satisfaction.\par Patient will follow with pain management service.\par \par Pt and family made aware of high risk of falls in an elderly pt who is frail especially with opioids.\par  had evaluated her for home care. Pt refused PT.\par \par Encouraged pt to improve nutrition.

## 2019-12-04 NOTE — PHYSICAL EXAM
[Ambulatory and capable of all self care but unable to carry out any work activities] : Status 2- Ambulatory and capable of all self care but unable to carry out any work activities. Up and about more than 50% of waking hours [Normal] : affect appropriate [de-identified] : sitting in wheelchair [de-identified] : no deformities appreciated [de-identified] : +5/5 strength LLE and LLE and  4-4+ strength RUE and RLE [de-identified] : approx 7 cm red patch on left paraspinal area in the lumbar region

## 2019-12-04 NOTE — RESULTS/DATA
[FreeTextEntry1] : PETCT [facility] ONC FDG SUBS \par \par \par PROCEDURE DATE: 11/26/2019 \par \par \par \par \par INTERPRETATION: FDG PET CT STUDY, SUBSEQUENT TREATMENT STRATEGY \par REASON: TUMOR IMAGING - PET with concurrently acquired CT for attenuation \par correction and anatomic localization; skull base to mid - thigh / 95043 \par \par CLINICAL HISTORY / REASON FOR EXAM: Endometrial cancer. Metastatic \par endometrial cancer, status post chemotherapy, last reported date 1 week \par prior. \par \par TECHNIQUE: Blood glucose pre injection 119 mg/dL. Approximately 45 minutes \par after the intravenous administration of 10.7 mCi 18-Fluorine FDG, whole body \par PET images were acquired from base of skull to mid - thigh. In addition, \par non-contrast, low dose, non - diagnostic CT was acquired for attenuation \par correction and anatomic correlation purposes only. \par \par COMPARISON: PET/CT September 23, 2019. \par \par FINDINGS: \par \par HEAD/FACE: Physiologic FDG uptake is seen in the visualized regions of the \par brain, large salivary glands and oropharynx. \par \par NECK: Physiologic FDG uptake is seen in neck muscles. \par \par CHEST: Physiologic FDG avidity is seen in mediastinal blood pool, myocardium. \par \par LUNGS: No abnormal uptake. \par \par PLEURA/PERICARDIUM: No abnormal uptake. \par \par THORACIC NODES: No abnormal uptake. \par \par HEPATOBILIARY: Interval resolution of hepatic metastases to non-FDG avid \par status. No abnormal hepatic radiotracer uptake. \par \par SPLEEN: No abnormal uptake. \par \par PANCREAS: No abnormal uptake. \par \par ADRENAL GLANDS: No abnormal uptake. \par \par KIDNEYS/URETERS/BLADDER: Excreted activity is seen. \par \par ABDOMINOPELVIC NODES: Interval resolution of FDG avid pelvic lymphadenopathy. \par \par BOWEL/PERITONEUM/MESENTERY: Interval resolution of FDG avid peritoneal \par implants and omental nodularity, with minimal residual non-FDG avid scarring \par or treated lesions. Nonspecific mild diffuse colorectal radiotracer uptake, \par likely physiologic. Colonic diverticulosis. \par \par PELVIC ORGANS: Marked interval decrease in extent of FDG avid uterine mass, \par now with residual focal mild FDG uptake, max SUV 5.3, previously 20.3. \par \par BONES/SOFT TISSUES: Interval resolution of left anterior 5th rib sclerotic \par lesion to non-FDG avid status. \par \par OTHER: Right chest port catheter tip the region of cavoatrial junction. \par Bilateral small-to-moderate pleural effusions, increased. Vascular \par calcifications. Post cholecystectomy. Osteopenia. Degenerative change and \par scoliosis of spine. \par \par IMPRESSION: \par Since September 23, 2019: \par \par 1. No new sites of pathologic FDG uptake. \par \par 2. Marked interval decrease in extent of FDG avid uterine mass, now with \par residual focal mild FDG uptake, max SUV 5.3, previously 20.3 (74% decrease). \par \par 3. Interval resolution of hepatic metastases to non-FDG avid status. No \par abnormal hepatic radiotracer uptake. \par \par 4. Interval resolution of FDG avid pelvic lymphadenopathy. \par \par 5. Interval resolution of FDG avid peritoneal implants and omental \par nodularity, with minimal residual non-FDG avid scarring or treated lesions. \par \par 6. Interval resolution of left anterior 5th rib sclerotic lesion to non-FDG \par avid status. \par \par \par \par

## 2019-12-05 DIAGNOSIS — F33.1 MAJOR DEPRESSIVE DISORDER, RECURRENT, MODERATE: ICD-10-CM

## 2019-12-05 LAB
ALBUMIN SERPL ELPH-MCNC: 3 G/DL
ALP BLD-CCNC: 102 U/L
ALT SERPL-CCNC: <5 U/L
ANION GAP SERPL CALC-SCNC: 15 MMOL/L
AST SERPL-CCNC: 9 U/L
BILIRUB SERPL-MCNC: 0.3 MG/DL
BUN SERPL-MCNC: 11 MG/DL
CALCIUM SERPL-MCNC: 7.9 MG/DL
CANCER AG125 SERPL-ACNC: 46 U/ML
CHLORIDE SERPL-SCNC: 105 MMOL/L
CO2 SERPL-SCNC: 23 MMOL/L
CREAT SERPL-MCNC: 0.9 MG/DL
FERRITIN SERPL-MCNC: 209 NG/ML
FOLATE SERPL-MCNC: >20 NG/ML
GLUCOSE SERPL-MCNC: 153 MG/DL
IRON SATN MFR SERPL: 29 %
IRON SERPL-MCNC: 49 UG/DL
LDH SERPL-CCNC: 202
POTASSIUM SERPL-SCNC: 3.9 MMOL/L
PROT SERPL-MCNC: 5.7 G/DL
SODIUM SERPL-SCNC: 143 MMOL/L
TIBC SERPL-MCNC: 167 UG/DL
UIBC SERPL-MCNC: 118 UG/DL
VIT B12 SERPL-MCNC: 1725 PG/ML

## 2019-12-11 ENCOUNTER — APPOINTMENT (OUTPATIENT)
Dept: INFUSION THERAPY | Facility: CLINIC | Age: 84
End: 2019-12-11

## 2019-12-16 ENCOUNTER — APPOINTMENT (OUTPATIENT)
Dept: INFUSION THERAPY | Facility: CLINIC | Age: 84
End: 2019-12-16

## 2019-12-16 ENCOUNTER — LABORATORY RESULT (OUTPATIENT)
Age: 84
End: 2019-12-16

## 2019-12-16 VITALS
SYSTOLIC BLOOD PRESSURE: 163 MMHG | HEART RATE: 56 BPM | TEMPERATURE: 97.6 F | HEIGHT: 66.5 IN | DIASTOLIC BLOOD PRESSURE: 70 MMHG

## 2019-12-16 LAB
HCT VFR BLD CALC: 24.3 %
HGB BLD-MCNC: 7.9 G/DL
MCHC RBC-ENTMCNC: 32.5 G/DL
MCHC RBC-ENTMCNC: 33.9 PG
MCV RBC AUTO: 104.3 FL
PLATELET # BLD AUTO: 177 K/UL
PMV BLD: 11 FL
RBC # BLD: 2.33 M/UL
RBC # FLD: 14.6 %
WBC # FLD AUTO: 4.73 K/UL

## 2019-12-16 RX ORDER — SODIUM CHLORIDE 9 MG/ML
1000 INJECTION INTRAMUSCULAR; INTRAVENOUS; SUBCUTANEOUS
Refills: 0 | Status: DISCONTINUED | OUTPATIENT
Start: 2019-12-16 | End: 2020-04-12

## 2019-12-16 RX ORDER — MAGNESIUM SULFATE 500 MG/ML
2 VIAL (ML) INJECTION ONCE
Refills: 0 | Status: COMPLETED | OUTPATIENT
Start: 2019-12-16 | End: 2019-12-16

## 2019-12-16 RX ADMIN — Medication 50 GRAM(S): at 13:24

## 2019-12-16 RX ADMIN — Medication 2 GRAM(S): at 14:27

## 2019-12-16 RX ADMIN — SODIUM CHLORIDE 1000 MILLILITER(S): 9 INJECTION INTRAMUSCULAR; INTRAVENOUS; SUBCUTANEOUS at 14:30

## 2019-12-16 RX ADMIN — SODIUM CHLORIDE 500 MILLILITER(S): 9 INJECTION INTRAMUSCULAR; INTRAVENOUS; SUBCUTANEOUS at 12:31

## 2019-12-17 ENCOUNTER — APPOINTMENT (OUTPATIENT)
Dept: INFUSION THERAPY | Facility: CLINIC | Age: 84
End: 2019-12-17

## 2019-12-17 LAB
ABO + RH PNL BLD: NORMAL
ALBUMIN SERPL ELPH-MCNC: 3.4 G/DL
ALP BLD-CCNC: 86 U/L
ALT SERPL-CCNC: <5 U/L
ANION GAP SERPL CALC-SCNC: 15 MMOL/L
AST SERPL-CCNC: 8 U/L
BILIRUB SERPL-MCNC: 0.3 MG/DL
BLD GP AB SCN SERPL QL: NORMAL
BUN SERPL-MCNC: 20 MG/DL
CALCIUM SERPL-MCNC: 8.8 MG/DL
CHLORIDE SERPL-SCNC: 100 MMOL/L
CO2 SERPL-SCNC: 24 MMOL/L
CREAT SERPL-MCNC: 1 MG/DL
GLUCOSE SERPL-MCNC: 121 MG/DL
MAGNESIUM SERPL-MCNC: 1.4 MG/DL
POTASSIUM SERPL-SCNC: 4.3 MMOL/L
PROT SERPL-MCNC: 6.2 G/DL
SODIUM SERPL-SCNC: 139 MMOL/L

## 2019-12-20 NOTE — ASSESSMENT
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 left 5th rib.\par \par Pt has an ECOG of 2-3.\par Even though she is on a wheel chair she is usually able to ambulate with the assistance of a walker and thereby has been managing most ADLs.  She seems to have deteriorated over the last 10 days however.  She has remained in chair or bed.  \par \par Pt was started on dose reduced weekly Carboplatin and Taxol s/p 3 cycles with interim PET scan showing a favorable response.\par \par \par RECOMMENDATIONS\par \par I had a detailed discussion with the patient and her family.  \par \par Pt wishes to continue with chemotherapy with Carboplatin and Taxol.  Will hold treatment today and re-evaluate her next week.\par Will draw stat labs today, T and C for transfusion tomorrow, Hgb 7.9.\par IV hydration.\par She was encouraged to restart PT at home, and speak to  or Psychiatrist regarding feelings of depression.\par Family members understand, and all their questions were answered.\par \par Given that she has a borderline performance status  chemotherapy dose will be reduced and given on a weekly schedule.  The side effects from chemotherapy including, but not limited to, nausea, vomiting, diarrhea, myelosuppression, risk of infection, neuropathy, infusion reactions, have all been discussed with the patient and her family. \par \par The chemotherapy dose was adjusted according to pt's height, weight, labs and anticipated tolerance.\par The high risk of complications and complexity associated with antineoplastic therapy administration has been explained to the pt and family.\par Chemotherapy will be administered under my direct supervision\par \par I discussed management of common side effects with the pt and her family. Advised them about dietary modification and use of stool softeners. Advised to use MOM as needed\par Prognosis was also discussed at length.\par All the questions and concerns addressed by the patient and her family were addressed to their satisfaction.\par Patient will follow with pain management service.\par \par Pt and family made aware of high risk of falls in an elderly pt who is frail especially with opioids.\par \par Encouraged pt to improve nutrition/fluid intake, and take Marinol BID.\par \par Case discussed and patient seen with Dr. Sneed.\par

## 2019-12-20 NOTE — CONSULT LETTER
[Dear  ___] : Dear  [unfilled], [Consult Letter:] : I had the pleasure of evaluating your patient, [unfilled]. [Sincerely,] : Sincerely, [Please see my note below.] : Please see my note below. [Consult Closing:] : Thank you very much for allowing me to participate in the care of this patient.  If you have any questions, please do not hesitate to contact me. [DrAngel  ___] : Dr. BERG [DrAngel ___] : Dr. BERG [FreeTextEntry3] : Keturah Sneed MD\par Attending Physician\par Cancer Services\par [facility]\par

## 2019-12-20 NOTE — HISTORY OF PRESENT ILLNESS
[de-identified] : 84 yof, PMH of CVA with R-sided residual weakness (2017), DM II, HTN, HLD, GERD, rheumatoid arthritis, and osteoarthritis was having lower abdominal pain.  CT abdomen/pelvis showed 3.3 cm endometrial thickening and pelvic LAD.  Patient had never had any postmenopausal bleeding.  She saw gynecologic oncology and underwent exam under anesthesia with dilation and curettage.  Pathology report demonstrated high-grade adenocarcinoma involving cervix and endocervix.  Endometrium could not assessed secondary to complete obstruction by tumor.  Tumor is p16 positive and without MMR deficiency.\par \par At baseline, patient lives alone and uses a walker around the home and a wheelchair outside the home.  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  Her two sons live nearby and will be transporting her to medical appointments. [de-identified] : 9/25/19\par pt is here for follow up.\par She has completed the PET scan and wants to discuss further plan for care.\par She still remains active.\par C/o abdominal pain for which she is on Tramadol as prescribed by her PCP,\par No vaginal bleeding\par \par 10/9/19\par Pt is here for follow up.\par She s/p 1 treatment with carboplatin and taxol.\par She tolerated the treatment well except for 2 episodes of diarrhea.\par pt has been using oxycodone/ acetaminophen 5/325mg 1 tab q 6 hours with relief of symptoms. Requesting another script.\par Denies any significant nausea or vomiting.\par Has poor appetite. Does not like to drink ensure\par \par 10/23/19\par Pt is here for follow up.\par Since last visit she had port placement after which she had an episode of vomiting.\par Her abdominal pain has not changed.\par She has been having constipation X 7 days.\par She states she took a stool softener x 3 times without relief.\par She is able to pass flatus.\par appetite is not improving.\par She has been using oxycodone 5 mg as needed with relief.\par \par 11/13/19\par Pt is here for follow up.\par States the pain is well controlled. She saw a pain Mgt specialist and she is on oxycodone and marinol. The appetite is sometimes improved.\par However she has lost 9 lb since last visit.\par She has a home aide 3 times a week. There is some family support.\par No N, V, D\par \par 12/4/19\par pt is here for follow up.\par Saw Dr Lopez today and may be switching to Fentanyl. \par States that she has decreased appetite, she has not been taking marinol daily.\par She has been having diarrhea, did not take imodium as advised.\par No fever.\par has mild nausea and few episodes of vomiting.\par She has vaginal spotting off and on.\par \par \par 12/16/19\par Patient presents to the office for scheduled chemotherapy.  She missed last week's appointment (Cycle 4, Day 8), due to multiple complaints.  She is accompanied by 3 family members who report she has not been herself over the last 2 weeks.  She has not been eating or drinking much.  She has been very weak and fatigued.  There are days when she won't even speak or respond.  She had been getting PT at home, but she refused to continue.  She has not been taking Marinol BID as prescribed.  Patient states she does not want to stop treatment, she just feels "run down".  Patient denies cough, shortness of breath, denies fever, denies bone pain.  Patient denies abdominal pain or bloating, denies vaginal bleeding or discharge.\par \par

## 2019-12-20 NOTE — END OF VISIT
[FreeTextEntry3] : I was present with the PA during the key portions of the history and exam. I agree with the findings and plan as documented in the PA's note, unless noted below.\par

## 2019-12-20 NOTE — REVIEW OF SYSTEMS
[Fatigue] : fatigue [Incontinence] : incontinence [Difficulty Walking] : difficulty walking [Depression] : depression [Negative] : Heme/Lymph [Abdominal Pain] : no abdominal pain [Dysmenorrhea/Abn Vaginal Bleeding] : no dysmenorrhea/abnormal vaginal bleeding [Joint Pain] : no joint pain [de-identified] : R-sided residual weakness secondary to CVA

## 2019-12-20 NOTE — RESULTS/DATA
[FreeTextEntry1] : PETCT [facility] ONC FDG SUBS \par \par \par PROCEDURE DATE: 11/26/2019 \par \par \par \par \par INTERPRETATION: FDG PET CT STUDY, SUBSEQUENT TREATMENT STRATEGY \par REASON: TUMOR IMAGING - PET with concurrently acquired CT for attenuation \par correction and anatomic localization; skull base to mid - thigh / [id number] \par \par CLINICAL HISTORY / REASON FOR EXAM: Endometrial cancer. Metastatic \par endometrial cancer, status post chemotherapy, last reported date 1 week \par prior. \par \par TECHNIQUE: Blood glucose pre injection 119 mg/dL. Approximately 45 minutes \par after the intravenous administration of 10.7 mCi 18-Fluorine FDG, whole body \par PET images were acquired from base of skull to mid - thigh. In addition, \par non-contrast, low dose, non - diagnostic CT was acquired for attenuation \par correction and anatomic correlation purposes only. \par \par COMPARISON: PET/CT September 23, 2019. \par \par FINDINGS: \par \par HEAD/FACE: Physiologic FDG uptake is seen in the visualized regions of the \par brain, large salivary glands and oropharynx. \par \par NECK: Physiologic FDG uptake is seen in neck muscles. \par \par CHEST: Physiologic FDG avidity is seen in mediastinal blood pool, myocardium. \par \par LUNGS: No abnormal uptake. \par \par PLEURA/PERICARDIUM: No abnormal uptake. \par \par THORACIC NODES: No abnormal uptake. \par \par HEPATOBILIARY: Interval resolution of hepatic metastases to non-FDG avid \par status. No abnormal hepatic radiotracer uptake. \par \par SPLEEN: No abnormal uptake. \par \par PANCREAS: No abnormal uptake. \par \par ADRENAL GLANDS: No abnormal uptake. \par \par KIDNEYS/URETERS/BLADDER: Excreted activity is seen. \par \par ABDOMINOPELVIC NODES: Interval resolution of FDG avid pelvic lymphadenopathy. \par \par BOWEL/PERITONEUM/MESENTERY: Interval resolution of FDG avid peritoneal \par implants and omental nodularity, with minimal residual non-FDG avid scarring \par or treated lesions. Nonspecific mild diffuse colorectal radiotracer uptake, \par likely physiologic. Colonic diverticulosis. \par \par PELVIC ORGANS: Marked interval decrease in extent of FDG avid uterine mass, \par now with residual focal mild FDG uptake, max SUV 5.3, previously 20.3. \par \par BONES/SOFT TISSUES: Interval resolution of left anterior 5th rib sclerotic \par lesion to non-FDG avid status. \par \par OTHER: Right chest port catheter tip the region of cavoatrial junction. \par Bilateral small-to-moderate pleural effusions, increased. Vascular \par calcifications. Post cholecystectomy. Osteopenia. Degenerative change and \par scoliosis of spine. \par \par IMPRESSION: \par Since September 23, 2019: \par \par 1. No new sites of pathologic FDG uptake. \par \par 2. Marked interval decrease in extent of FDG avid uterine mass, now with \par residual focal mild FDG uptake, max SUV 5.3, previously 20.3 (74% decrease). \par \par 3. Interval resolution of hepatic metastases to non-FDG avid status. No \par abnormal hepatic radiotracer uptake. \par \par 4. Interval resolution of FDG avid pelvic lymphadenopathy. \par \par 5. Interval resolution of FDG avid peritoneal implants and omental \par nodularity, with minimal residual non-FDG avid scarring or treated lesions. \par \par 6. Interval resolution of left anterior 5th rib sclerotic lesion to non-FDG \par avid status. \par \par \par \par

## 2019-12-20 NOTE — PHYSICAL EXAM
[Ambulatory and capable of all self care but unable to carry out any work activities] : Status 2- Ambulatory and capable of all self care but unable to carry out any work activities. Up and about more than 50% of waking hours [Normal] : normoactive bowel sounds, soft and nontender, no hepatosplenomegaly or masses appreciated [de-identified] : sitting in wheelchair, appears weak, pale [de-identified] : flat affect [de-identified] : bradycardic [de-identified] : kyphosis

## 2019-12-26 ENCOUNTER — LABORATORY RESULT (OUTPATIENT)
Age: 84
End: 2019-12-26

## 2019-12-26 ENCOUNTER — APPOINTMENT (OUTPATIENT)
Dept: HEMATOLOGY ONCOLOGY | Facility: CLINIC | Age: 84
End: 2019-12-26
Payer: MEDICARE

## 2019-12-26 ENCOUNTER — APPOINTMENT (OUTPATIENT)
Dept: INFUSION THERAPY | Facility: CLINIC | Age: 84
End: 2019-12-26
Payer: MEDICARE

## 2019-12-26 VITALS
DIASTOLIC BLOOD PRESSURE: 63 MMHG | WEIGHT: 140 LBS | SYSTOLIC BLOOD PRESSURE: 135 MMHG | RESPIRATION RATE: 18 BRPM | HEIGHT: 66 IN | TEMPERATURE: 97.3 F | HEART RATE: 54 BPM | BODY MASS INDEX: 22.5 KG/M2

## 2019-12-26 PROCEDURE — 99214 OFFICE O/P EST MOD 30 MIN: CPT

## 2019-12-26 RX ORDER — FAMOTIDINE 10 MG/ML
20 INJECTION INTRAVENOUS ONCE
Refills: 0 | Status: COMPLETED | OUTPATIENT
Start: 2019-12-26 | End: 2019-12-26

## 2019-12-26 RX ORDER — CARBOPLATIN 50 MG
155 VIAL (EA) INTRAVENOUS ONCE
Refills: 0 | Status: COMPLETED | OUTPATIENT
Start: 2019-12-26 | End: 2019-12-26

## 2019-12-26 RX ORDER — DEXAMETHASONE 0.5 MG/5ML
12 ELIXIR ORAL ONCE
Refills: 0 | Status: COMPLETED | OUTPATIENT
Start: 2019-12-26 | End: 2019-12-26

## 2019-12-26 RX ORDER — PACLITAXEL 6 MG/ML
110 INJECTION, SOLUTION, CONCENTRATE INTRAVENOUS ONCE
Refills: 0 | Status: COMPLETED | OUTPATIENT
Start: 2019-12-26 | End: 2019-12-26

## 2019-12-26 RX ORDER — DIPHENHYDRAMINE HCL 50 MG
50 CAPSULE ORAL ONCE
Refills: 0 | Status: COMPLETED | OUTPATIENT
Start: 2019-12-26 | End: 2019-12-26

## 2019-12-26 RX ADMIN — PACLITAXEL 110 MILLIGRAM(S): 6 INJECTION, SOLUTION, CONCENTRATE INTRAVENOUS at 16:40

## 2019-12-26 RX ADMIN — Medication 50 MILLIGRAM(S): at 14:42

## 2019-12-26 RX ADMIN — Medication 265.5 MILLIGRAM(S): at 14:36

## 2019-12-26 RX ADMIN — FAMOTIDINE 20 MILLIGRAM(S): 10 INJECTION INTRAVENOUS at 14:22

## 2019-12-26 RX ADMIN — FAMOTIDINE 156 MILLIGRAM(S): 10 INJECTION INTRAVENOUS at 14:02

## 2019-12-26 RX ADMIN — PACLITAXEL 268.33 MILLIGRAM(S): 6 INJECTION, SOLUTION, CONCENTRATE INTRAVENOUS at 15:40

## 2019-12-26 RX ADMIN — Medication 153 MILLIGRAM(S): at 14:22

## 2019-12-26 RX ADMIN — Medication 155 MILLIGRAM(S): at 15:40

## 2019-12-26 RX ADMIN — Medication 183 MILLIGRAM(S): at 13:42

## 2019-12-26 RX ADMIN — Medication 12 MILLIGRAM(S): at 14:02

## 2019-12-30 NOTE — PHYSICAL EXAM
[Ambulatory and capable of all self care but unable to carry out any work activities] : Status 2- Ambulatory and capable of all self care but unable to carry out any work activities. Up and about more than 50% of waking hours [Normal] : affect appropriate [de-identified] : bradycardic [de-identified] : Sitting in wheelchair, appears weak, pale but alert and responsive. [de-identified] : Kyphosis [de-identified] : flat affect

## 2019-12-30 NOTE — REVIEW OF SYSTEMS
[Fatigue] : fatigue [Incontinence] : incontinence [Difficulty Walking] : difficulty walking [Depression] : depression [Negative] : Heme/Lymph [Abdominal Pain] : no abdominal pain [Dysmenorrhea/Abn Vaginal Bleeding] : no dysmenorrhea/abnormal vaginal bleeding [Joint Pain] : no joint pain [de-identified] : R-sided residual weakness secondary to CVA

## 2019-12-30 NOTE — RESULTS/DATA
[FreeTextEntry1] : PETCT [facility] ONC FDG SUBS \par \par \par PROCEDURE DATE: 11/26/2019 \par \par \par \par \par INTERPRETATION: FDG PET CT STUDY, SUBSEQUENT TREATMENT STRATEGY \par REASON: TUMOR IMAGING - PET with concurrently acquired CT for attenuation \par correction and anatomic localization; skull base to mid - thigh / [id number] \par \par CLINICAL HISTORY / REASON FOR EXAM: Endometrial cancer. Metastatic \par endometrial cancer, status post chemotherapy, last reported date 1 week \par prior. \par \par TECHNIQUE: Blood glucose pre injection 119 mg/dL. Approximately 45 minutes \par after the intravenous administration of 10.7 mCi 18-Fluorine FDG, whole body \par PET images were acquired from base of skull to mid - thigh. In addition, \par non-contrast, low dose, non - diagnostic CT was acquired for attenuation \par correction and anatomic correlation purposes only. \par \par COMPARISON: PET/CT September 23, 2019. \par \par FINDINGS: \par \par HEAD/FACE: Physiologic FDG uptake is seen in the visualized regions of the \par brain, large salivary glands and oropharynx. \par \par NECK: Physiologic FDG uptake is seen in neck muscles. \par \par CHEST: Physiologic FDG avidity is seen in mediastinal blood pool, myocardium. \par \par LUNGS: No abnormal uptake. \par \par PLEURA/PERICARDIUM: No abnormal uptake. \par \par THORACIC NODES: No abnormal uptake. \par \par HEPATOBILIARY: Interval resolution of hepatic metastases to non-FDG avid \par status. No abnormal hepatic radiotracer uptake. \par \par SPLEEN: No abnormal uptake. \par \par PANCREAS: No abnormal uptake. \par \par ADRENAL GLANDS: No abnormal uptake. \par \par KIDNEYS/URETERS/BLADDER: Excreted activity is seen. \par \par ABDOMINOPELVIC NODES: Interval resolution of FDG avid pelvic lymphadenopathy. \par \par BOWEL/PERITONEUM/MESENTERY: Interval resolution of FDG avid peritoneal \par implants and omental nodularity, with minimal residual non-FDG avid scarring \par or treated lesions. Nonspecific mild diffuse colorectal radiotracer uptake, \par likely physiologic. Colonic diverticulosis. \par \par PELVIC ORGANS: Marked interval decrease in extent of FDG avid uterine mass, \par now with residual focal mild FDG uptake, max SUV 5.3, previously 20.3. \par \par BONES/SOFT TISSUES: Interval resolution of left anterior 5th rib sclerotic \par lesion to non-FDG avid status. \par \par OTHER: Right chest port catheter tip the region of cavoatrial junction. \par Bilateral small-to-moderate pleural effusions, increased. Vascular \par calcifications. Post cholecystectomy. Osteopenia. Degenerative change and \par scoliosis of spine. \par \par IMPRESSION: \par Since September 23, 2019: \par \par 1. No new sites of pathologic FDG uptake. \par \par 2. Marked interval decrease in extent of FDG avid uterine mass, now with \par residual focal mild FDG uptake, max SUV 5.3, previously 20.3 (74% decrease). \par \par 3. Interval resolution of hepatic metastases to non-FDG avid status. No \par abnormal hepatic radiotracer uptake. \par \par 4. Interval resolution of FDG avid pelvic lymphadenopathy. \par \par 5. Interval resolution of FDG avid peritoneal implants and omental \par nodularity, with minimal residual non-FDG avid scarring or treated lesions. \par \par 6. Interval resolution of left anterior 5th rib sclerotic lesion to non-FDG \par avid status. \par \par \par \par

## 2019-12-30 NOTE — CONSULT LETTER
[Dear  ___] : Dear  [unfilled], [Consult Letter:] : I had the pleasure of evaluating your patient, [unfilled]. [Please see my note below.] : Please see my note below. [Consult Closing:] : Thank you very much for allowing me to participate in the care of this patient.  If you have any questions, please do not hesitate to contact me. [Sincerely,] : Sincerely, [DrAngel  ___] : Dr. BERG [DrAngel ___] : Dr. BERG [FreeTextEntry3] : JAYY Blevins MD (covering for Dr. Sneed).\par Attending Physician\par Cancer Services\par [facility]\par

## 2019-12-30 NOTE — HISTORY OF PRESENT ILLNESS
[de-identified] : 84 yof, PMH of CVA with R-sided residual weakness (2017), DM II, HTN, HLD, GERD, rheumatoid arthritis, and osteoarthritis was having lower abdominal pain.  CT abdomen/pelvis showed 3.3 cm endometrial thickening and pelvic LAD.  Patient had never had any postmenopausal bleeding.  She saw gynecologic oncology and underwent exam under anesthesia with dilation and curettage.  Pathology report demonstrated high-grade adenocarcinoma involving cervix and endocervix.  Endometrium could not assessed secondary to complete obstruction by tumor.  Tumor is p16 positive and without MMR deficiency.\par \par At baseline, patient lives alone and uses a walker around the home and a wheelchair outside the home.  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  Her two sons live nearby and will be transporting her to medical appointments. [de-identified] : 9/25/19\par pt is here for follow up.\par She has completed the PET scan and wants to discuss further plan for care.\par She still remains active.\par C/o abdominal pain for which she is on Tramadol as prescribed by her PCP,\par No vaginal bleeding\par \par 10/9/19\par Pt is here for follow up.\par She s/p 1 treatment with carboplatin and taxol.\par She tolerated the treatment well except for 2 episodes of diarrhea.\par pt has been using oxycodone/ acetaminophen 5/325mg 1 tab q 6 hours with relief of symptoms. Requesting another script.\par Denies any significant nausea or vomiting.\par Has poor appetite. Does not like to drink ensure\par \par 10/23/19\par Pt is here for follow up.\par Since last visit she had port placement after which she had an episode of vomiting.\par Her abdominal pain has not changed.\par She has been having constipation X 7 days.\par She states she took a stool softener x 3 times without relief.\par She is able to pass flatus.\par appetite is not improving.\par She has been using oxycodone 5 mg as needed with relief.\par \par 11/13/19\par Pt is here for follow up.\par States the pain is well controlled. She saw a pain Mgt specialist and she is on oxycodone and marinol. The appetite is sometimes improved.\par However she has lost 9 lb since last visit.\par She has a home aide 3 times a week. There is some family support.\par No N, V, D\par \par 12/4/19\par pt is here for follow up.\par Saw Dr Lopez today and may be switching to Fentanyl. \par States that she has decreased appetite, she has not been taking marinol daily.\par She has been having diarrhea, did not take imodium as advised.\par No fever.\par has mild nausea and few episodes of vomiting.\par She has vaginal spotting off and on.\par \par \par 12/16/19\par Patient presents to the office for scheduled chemotherapy.  She missed last week's appointment (Cycle 4, Day 8), due to multiple complaints.  She is accompanied by 3 family members who report she has not been herself over the last 2 weeks.  She has not been eating or drinking much.  She has been very weak and fatigued.  There are days when she won't even speak or respond.  She had been getting PT at home, but she refused to continue.  She has not been taking Marinol BID as prescribed.  Patient states she does not want to stop treatment, she just feels "run down".  Patient denies cough, shortness of breath, denies fever, denies bone pain.  Patient denies abdominal pain or bloating, denies vaginal bleeding or discharge.\par \par 12/26/19\par Patient is here today for follow up visit and evaluation/continuation for chemotherapy with carboplatin and Taxol.  She is accompanied by her daughter and two sons.  Although she missed cycle 4, day 8 chemo, she and her family would like to proceed with today's chemo.  They said that she is more responsive now and had an appetite to eat yesterday, [place].  She is taking Marinol which is helping with her appetite.  She is still weak but denies any fever, nausea, vomiting, diarrhea, abdominal pain, bloating, vaginal bleeding or discharge. She received blood transfusion last week.\par Family requests to speak with Madeline Barbosa, psychologist, who spoke with them at the initial visit.

## 2020-01-02 ENCOUNTER — OTHER (OUTPATIENT)
Age: 85
End: 2020-01-02

## 2020-01-02 ENCOUNTER — LABORATORY RESULT (OUTPATIENT)
Age: 85
End: 2020-01-02

## 2020-01-02 ENCOUNTER — APPOINTMENT (OUTPATIENT)
Dept: INFUSION THERAPY | Facility: CLINIC | Age: 85
End: 2020-01-02

## 2020-01-02 RX ORDER — FAMOTIDINE 10 MG/ML
20 INJECTION INTRAVENOUS ONCE
Refills: 0 | Status: COMPLETED | OUTPATIENT
Start: 2020-01-02 | End: 2020-01-02

## 2020-01-02 RX ORDER — CARBOPLATIN 50 MG
155 VIAL (EA) INTRAVENOUS ONCE
Refills: 0 | Status: COMPLETED | OUTPATIENT
Start: 2020-01-02 | End: 2020-01-02

## 2020-01-02 RX ORDER — DIPHENHYDRAMINE HCL 50 MG
50 CAPSULE ORAL ONCE
Refills: 0 | Status: COMPLETED | OUTPATIENT
Start: 2020-01-02 | End: 2020-01-02

## 2020-01-02 RX ORDER — PACLITAXEL 6 MG/ML
110 INJECTION, SOLUTION, CONCENTRATE INTRAVENOUS ONCE
Refills: 0 | Status: COMPLETED | OUTPATIENT
Start: 2020-01-02 | End: 2020-01-02

## 2020-01-02 RX ORDER — PEGFILGRASTIM-CBQV 6 MG/.6ML
6 INJECTION, SOLUTION SUBCUTANEOUS ONCE
Refills: 0 | Status: COMPLETED | OUTPATIENT
Start: 2020-01-02 | End: 2020-01-02

## 2020-01-02 RX ORDER — DEXAMETHASONE 0.5 MG/5ML
12 ELIXIR ORAL ONCE
Refills: 0 | Status: COMPLETED | OUTPATIENT
Start: 2020-01-02 | End: 2020-01-02

## 2020-01-02 RX ADMIN — Medication 153 MILLIGRAM(S): at 14:54

## 2020-01-02 RX ADMIN — Medication 50 MILLIGRAM(S): at 15:12

## 2020-01-02 RX ADMIN — FAMOTIDINE 156 MILLIGRAM(S): 10 INJECTION INTRAVENOUS at 14:34

## 2020-01-02 RX ADMIN — Medication 265.5 MILLIGRAM(S): at 16:30

## 2020-01-02 RX ADMIN — PACLITAXEL 110 MILLIGRAM(S): 6 INJECTION, SOLUTION, CONCENTRATE INTRAVENOUS at 16:30

## 2020-01-02 RX ADMIN — FAMOTIDINE 20 MILLIGRAM(S): 10 INJECTION INTRAVENOUS at 14:52

## 2020-01-02 RX ADMIN — Medication 183 MILLIGRAM(S): at 14:14

## 2020-01-02 RX ADMIN — PEGFILGRASTIM-CBQV 6 MILLIGRAM(S): 6 INJECTION, SOLUTION SUBCUTANEOUS at 17:30

## 2020-01-02 RX ADMIN — Medication 12 MILLIGRAM(S): at 14:20

## 2020-01-02 RX ADMIN — Medication 155 MILLIGRAM(S): at 17:30

## 2020-01-02 RX ADMIN — PACLITAXEL 268.33 MILLIGRAM(S): 6 INJECTION, SOLUTION, CONCENTRATE INTRAVENOUS at 15:23

## 2020-01-06 ENCOUNTER — LABORATORY RESULT (OUTPATIENT)
Age: 85
End: 2020-01-06

## 2020-01-06 ENCOUNTER — APPOINTMENT (OUTPATIENT)
Dept: HEMATOLOGY ONCOLOGY | Facility: CLINIC | Age: 85
End: 2020-01-06

## 2020-01-07 ENCOUNTER — APPOINTMENT (OUTPATIENT)
Dept: INFUSION THERAPY | Facility: CLINIC | Age: 85
End: 2020-01-07

## 2020-01-07 ENCOUNTER — OUTPATIENT (OUTPATIENT)
Dept: OUTPATIENT SERVICES | Facility: HOSPITAL | Age: 85
LOS: 1 days | Discharge: HOME | End: 2020-01-07

## 2020-01-07 DIAGNOSIS — Z23 ENCOUNTER FOR IMMUNIZATION: ICD-10-CM

## 2020-01-07 DIAGNOSIS — I10 ESSENTIAL (PRIMARY) HYPERTENSION: ICD-10-CM

## 2020-01-07 DIAGNOSIS — Z92.89 PERSONAL HISTORY OF OTHER MEDICAL TREATMENT: Chronic | ICD-10-CM

## 2020-01-07 DIAGNOSIS — C54.1 MALIGNANT NEOPLASM OF ENDOMETRIUM: ICD-10-CM

## 2020-01-07 DIAGNOSIS — C53.9 MALIGNANT NEOPLASM OF CERVIX UTERI, UNSPECIFIED: ICD-10-CM

## 2020-01-07 DIAGNOSIS — C78.6 SECONDARY MALIGNANT NEOPLASM OF RETROPERITONEUM AND PERITONEUM: ICD-10-CM

## 2020-01-07 DIAGNOSIS — Z90.89 ACQUIRED ABSENCE OF OTHER ORGANS: Chronic | ICD-10-CM

## 2020-01-07 DIAGNOSIS — C57.9 MALIGNANT NEOPLASM OF FEMALE GENITAL ORGAN, UNSPECIFIED: ICD-10-CM

## 2020-01-07 DIAGNOSIS — M19.90 UNSPECIFIED OSTEOARTHRITIS, UNSPECIFIED SITE: ICD-10-CM

## 2020-01-07 DIAGNOSIS — E11.9 TYPE 2 DIABETES MELLITUS WITHOUT COMPLICATIONS: ICD-10-CM

## 2020-01-07 DIAGNOSIS — C77.2 SECONDARY AND UNSPECIFIED MALIGNANT NEOPLASM OF INTRA-ABDOMINAL LYMPH NODES: ICD-10-CM

## 2020-01-07 DIAGNOSIS — Z51.11 ENCOUNTER FOR ANTINEOPLASTIC CHEMOTHERAPY: ICD-10-CM

## 2020-01-07 DIAGNOSIS — Z98.51 TUBAL LIGATION STATUS: Chronic | ICD-10-CM

## 2020-01-07 DIAGNOSIS — C78.7 SECONDARY MALIGNANT NEOPLASM OF LIVER AND INTRAHEPATIC BILE DUCT: ICD-10-CM

## 2020-01-07 LAB
ABO + RH PNL BLD: NORMAL
ALBUMIN SERPL ELPH-MCNC: 3.4 G/DL
ALP BLD-CCNC: 82 U/L
ALT SERPL-CCNC: 7 U/L
ANION GAP SERPL CALC-SCNC: 14 MMOL/L
AST SERPL-CCNC: 12 U/L
BILIRUB SERPL-MCNC: 0.2 MG/DL
BLD GP AB SCN SERPL QL: NORMAL
BUN SERPL-MCNC: 25 MG/DL
CALCIUM SERPL-MCNC: 9.1 MG/DL
CANCER AG125 SERPL-ACNC: 48 U/ML
CHLORIDE SERPL-SCNC: 103 MMOL/L
CO2 SERPL-SCNC: 25 MMOL/L
CREAT SERPL-MCNC: 1.1 MG/DL
GLUCOSE SERPL-MCNC: 124 MG/DL
HCT VFR BLD CALC: 22.3 %
HCT VFR BLD CALC: 26.5 %
HCT VFR BLD CALC: 31.3 %
HGB BLD-MCNC: 10 G/DL
HGB BLD-MCNC: 7.3 G/DL
HGB BLD-MCNC: 8.6 G/DL
MCHC RBC-ENTMCNC: 31.9 G/DL
MCHC RBC-ENTMCNC: 32.5 G/DL
MCHC RBC-ENTMCNC: 32.7 G/DL
MCHC RBC-ENTMCNC: 32.7 PG
MCHC RBC-ENTMCNC: 33 PG
MCHC RBC-ENTMCNC: 33.5 PG
MCV RBC AUTO: 100.8 FL
MCV RBC AUTO: 102.3 FL
MCV RBC AUTO: 103.3 FL
PLATELET # BLD AUTO: 121 K/UL
PLATELET # BLD AUTO: 180 K/UL
PLATELET # BLD AUTO: 232 K/UL
PMV BLD: 10.6 FL
PMV BLD: 10.8 FL
PMV BLD: 11.5 FL
POTASSIUM SERPL-SCNC: 4.4 MMOL/L
PROT SERPL-MCNC: 6 G/DL
RBC # BLD: 2.18 M/UL
RBC # BLD: 2.63 M/UL
RBC # BLD: 3.03 M/UL
RBC # FLD: 14.6 %
RBC # FLD: 14.7 %
RBC # FLD: 15.6 %
SODIUM SERPL-SCNC: 142 MMOL/L
WBC # FLD AUTO: 10.11 K/UL
WBC # FLD AUTO: 5.72 K/UL
WBC # FLD AUTO: 6.2 K/UL

## 2020-01-16 ENCOUNTER — APPOINTMENT (OUTPATIENT)
Dept: INFUSION THERAPY | Facility: CLINIC | Age: 85
End: 2020-01-16
Payer: MEDICARE

## 2020-01-16 ENCOUNTER — LABORATORY RESULT (OUTPATIENT)
Age: 85
End: 2020-01-16

## 2020-01-16 ENCOUNTER — APPOINTMENT (OUTPATIENT)
Dept: HEMATOLOGY ONCOLOGY | Facility: CLINIC | Age: 85
End: 2020-01-16
Payer: MEDICARE

## 2020-01-16 VITALS
DIASTOLIC BLOOD PRESSURE: 58 MMHG | SYSTOLIC BLOOD PRESSURE: 132 MMHG | RESPIRATION RATE: 18 BRPM | HEART RATE: 54 BPM | TEMPERATURE: 98 F

## 2020-01-16 VITALS — WEIGHT: 142 LBS | HEIGHT: 66 IN | BODY MASS INDEX: 22.82 KG/M2

## 2020-01-16 LAB
HCT VFR BLD CALC: 38.5 %
HGB BLD-MCNC: 12.7 G/DL
MCHC RBC-ENTMCNC: 31.6 PG
MCHC RBC-ENTMCNC: 33 G/DL
MCV RBC AUTO: 95.8 FL
PLATELET # BLD AUTO: 143 K/UL
PMV BLD: 11.9 FL
RBC # BLD: 4.02 M/UL
RBC # FLD: 17.5 %
WBC # FLD AUTO: 23.46 K/UL

## 2020-01-16 PROCEDURE — 99215 OFFICE O/P EST HI 40 MIN: CPT

## 2020-01-16 RX ORDER — FAMOTIDINE 10 MG/ML
20 INJECTION INTRAVENOUS ONCE
Refills: 0 | Status: COMPLETED | OUTPATIENT
Start: 2020-01-16 | End: 2020-01-16

## 2020-01-16 RX ORDER — DIPHENHYDRAMINE HCL 50 MG
50 CAPSULE ORAL ONCE
Refills: 0 | Status: COMPLETED | OUTPATIENT
Start: 2020-01-16 | End: 2020-01-16

## 2020-01-16 RX ORDER — DEXAMETHASONE 0.5 MG/5ML
12 ELIXIR ORAL ONCE
Refills: 0 | Status: COMPLETED | OUTPATIENT
Start: 2020-01-16 | End: 2020-01-16

## 2020-01-16 RX ORDER — CARBOPLATIN 50 MG
155 VIAL (EA) INTRAVENOUS ONCE
Refills: 0 | Status: COMPLETED | OUTPATIENT
Start: 2020-01-16 | End: 2020-01-16

## 2020-01-16 RX ORDER — PACLITAXEL 6 MG/ML
110 INJECTION, SOLUTION, CONCENTRATE INTRAVENOUS ONCE
Refills: 0 | Status: COMPLETED | OUTPATIENT
Start: 2020-01-16 | End: 2020-01-16

## 2020-01-16 RX ADMIN — Medication 183 MILLIGRAM(S): at 14:09

## 2020-01-16 RX ADMIN — PACLITAXEL 268.33 MILLIGRAM(S): 6 INJECTION, SOLUTION, CONCENTRATE INTRAVENOUS at 15:08

## 2020-01-16 RX ADMIN — Medication 265.5 MILLIGRAM(S): at 15:07

## 2020-01-16 RX ADMIN — Medication 153 MILLIGRAM(S): at 14:09

## 2020-01-16 RX ADMIN — FAMOTIDINE 156 MILLIGRAM(S): 10 INJECTION INTRAVENOUS at 14:09

## 2020-01-17 LAB
ALBUMIN SERPL ELPH-MCNC: 3.4 G/DL
ALP BLD-CCNC: 145 U/L
ALT SERPL-CCNC: <5 U/L
ANION GAP SERPL CALC-SCNC: 18 MMOL/L
AST SERPL-CCNC: 8 U/L
BILIRUB SERPL-MCNC: 0.5 MG/DL
BUN SERPL-MCNC: 18 MG/DL
CALCIUM SERPL-MCNC: 8.5 MG/DL
CANCER AG125 SERPL-ACNC: 38 U/ML
CHLORIDE SERPL-SCNC: 101 MMOL/L
CO2 SERPL-SCNC: 23 MMOL/L
CREAT SERPL-MCNC: 0.9 MG/DL
GLUCOSE SERPL-MCNC: 68 MG/DL
POTASSIUM SERPL-SCNC: 3.9 MMOL/L
PROT SERPL-MCNC: 5.7 G/DL
SODIUM SERPL-SCNC: 142 MMOL/L

## 2020-01-17 NOTE — REVIEW OF SYSTEMS
[Fatigue] : fatigue [Incontinence] : incontinence [Depression] : depression [Difficulty Walking] : difficulty walking [Negative] : Endocrine [Dysmenorrhea/Abn Vaginal Bleeding] : no dysmenorrhea/abnormal vaginal bleeding [Abdominal Pain] : no abdominal pain [de-identified] : R-sided residual weakness secondary to CVA [Joint Pain] : no joint pain

## 2020-01-17 NOTE — HISTORY OF PRESENT ILLNESS
[de-identified] : 84 yof, PMH of CVA with R-sided residual weakness (2017), DM II, HTN, HLD, GERD, rheumatoid arthritis, and osteoarthritis was having lower abdominal pain.  CT abdomen/pelvis showed 3.3 cm endometrial thickening and pelvic LAD.  Patient had never had any postmenopausal bleeding.  She saw gynecologic oncology and underwent exam under anesthesia with dilation and curettage.  Pathology report demonstrated high-grade adenocarcinoma involving cervix and endocervix.  Endometrium could not assessed secondary to complete obstruction by tumor.  Tumor is p16 positive and without MMR deficiency.\par \par At baseline, patient lives alone and uses a walker around the home and a wheelchair outside the home.  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  Her two sons live nearby and will be transporting her to medical appointments. [de-identified] : 9/25/19\par pt is here for follow up.\par She has completed the PET scan and wants to discuss further plan for care.\par She still remains active.\par C/o abdominal pain for which she is on Tramadol as prescribed by her PCP,\par No vaginal bleeding\par \par 10/9/19\par Pt is here for follow up.\par She s/p 1 treatment with carboplatin and taxol.\par She tolerated the treatment well except for 2 episodes of diarrhea.\par pt has been using oxycodone/ acetaminophen 5/325mg 1 tab q 6 hours with relief of symptoms. Requesting another script.\par Denies any significant nausea or vomiting.\par Has poor appetite. Does not like to drink ensure\par \par 10/23/19\par Pt is here for follow up.\par Since last visit she had port placement after which she had an episode of vomiting.\par Her abdominal pain has not changed.\par She has been having constipation X 7 days.\par She states she took a stool softener x 3 times without relief.\par She is able to pass flatus.\par appetite is not improving.\par She has been using oxycodone 5 mg as needed with relief.\par \par 11/13/19\par Pt is here for follow up.\par States the pain is well controlled. She saw a pain Mgt specialist and she is on oxycodone and marinol. The appetite is sometimes improved.\par However she has lost 9 lb since last visit.\par She has a home aide 3 times a week. There is some family support.\par No N, V, D\par \par 12/4/19\par pt is here for follow up.\par Saw Dr Lopez today and may be switching to Fentanyl. \par States that she has decreased appetite, she has not been taking marinol daily.\par She has been having diarrhea, did not take imodium as advised.\par No fever.\par has mild nausea and few episodes of vomiting.\par She has vaginal spotting off and on.\par \par \par 12/16/19\par Patient presents to the office for scheduled chemotherapy.  She missed last week's appointment (Cycle 4, Day 8), due to multiple complaints.  She is accompanied by 3 family members who report she has not been herself over the last 2 weeks.  She has not been eating or drinking much.  She has been very weak and fatigued.  There are days when she won't even speak or respond.  She had been getting PT at home, but she refused to continue.  She has not been taking Marinol BID as prescribed.  Patient states she does not want to stop treatment, she just feels "run down".  Patient denies cough, shortness of breath, denies fever, denies bone pain.  Patient denies abdominal pain or bloating, denies vaginal bleeding or discharge.\par \par 1/16/20\par Pt is here for follow up.\par pt is feeling better.\par Her appetite has improved. She is now on Marinol 10 mg bid prescribed by her pain Mgt specialist.\par Pt is in her bed most of the time.\par She is not getting out of the bed not even to use the bathroom.\par  Patient denies abdominal pain or bloating, denies vaginal bleeding or discharge.\par  Patient denies cough, shortness of breath, denies fever, denies bone pain\par She has not participated in PT for a long time.\par Pt has not needed pain meds as much as before.\par \par

## 2020-01-17 NOTE — ASSESSMENT
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 left 5th rib.\par \par Pt has an ECOG of 2-3.\par \par Pt was started on dose reduced weekly Carboplatin and Taxol s/p 5 cycles with interim PET scan showing a favorable response.\par \par \par RECOMMENDATIONS\par \par I had a detailed discussion with the patient and her family.  \par \par Pt will continue with chemotherapy with Carboplatin and Taxol. proceed with Cycle # 4\par Given that she has a borderline performance status chemotherapy dose will be reduced and given on a weekly schedule. The side effects from chemotherapy including, but not limited to, nausea, vomiting, diarrhea, myelosuppression, risk of infection, neuropathy, infusion reactions, have all been discussed with the patient and her family. \par \par The chemotherapy dose was adjusted according to pt's height, weight, labs and anticipated tolerance.\par The high risk of complications and complexity associated with antineoplastic therapy administration has been explained to the pt and family.\par Chemotherapy will be administered under my direct supervision\par \par I discussed management of common side effects with the pt and her family. \par Prognosis was also discussed at length.\par Pt's son had previously contacted me on the phone requesting info for hospice. I discussed the concept of hospice and how it involves supportive measures only and that chemo would not be continued on hospice.\par They have for now decided to continue with chemotherapy.\par They also had questions about repeated PRBC transfusions, i discussed procrit with them and its side effects. They will inform me of their decision after discussion amongst themselves.\par All the questions and concerns addressed by the patient and her family were addressed to their satisfaction.\par Patient will follow with pain management service.\par \par Pt and family made aware of high risk of falls in an elderly pt who is frail especially with opioids.\par  had evaluated her for home care. Pt  requesting home PT now.\par \par Encouraged pt to improve nutrition. \par \par

## 2020-01-17 NOTE — PHYSICAL EXAM
[Ambulatory and capable of all self care but unable to carry out any work activities] : Status 2- Ambulatory and capable of all self care but unable to carry out any work activities. Up and about more than 50% of waking hours [Normal] : no peripheral adenopathy appreciated [de-identified] : bradycardic [de-identified] : sitting in wheelchair, appears weak, pale [de-identified] : kyphosis [de-identified] : flat affect

## 2020-01-17 NOTE — CONSULT LETTER
[Dear  ___] : Dear  [unfilled], [Consult Letter:] : I had the pleasure of evaluating your patient, [unfilled]. [Please see my note below.] : Please see my note below. [Consult Closing:] : Thank you very much for allowing me to participate in the care of this patient.  If you have any questions, please do not hesitate to contact me. [Sincerely,] : Sincerely, [DrAngel ___] : Dr. BERG [DrAngel  ___] : Dr. BERG [FreeTextEntry3] : Keturah Sneed MD\par Attending Physician\par Cancer Services\par [facility]\par

## 2020-01-23 ENCOUNTER — LABORATORY RESULT (OUTPATIENT)
Age: 85
End: 2020-01-23

## 2020-01-23 ENCOUNTER — APPOINTMENT (OUTPATIENT)
Dept: INFUSION THERAPY | Facility: CLINIC | Age: 85
End: 2020-01-23

## 2020-01-23 RX ORDER — FAMOTIDINE 10 MG/ML
20 INJECTION INTRAVENOUS ONCE
Refills: 0 | Status: COMPLETED | OUTPATIENT
Start: 2020-01-23 | End: 2020-01-23

## 2020-01-23 RX ORDER — DEXAMETHASONE 0.5 MG/5ML
12 ELIXIR ORAL ONCE
Refills: 0 | Status: COMPLETED | OUTPATIENT
Start: 2020-01-23 | End: 2020-01-23

## 2020-01-23 RX ORDER — PEGFILGRASTIM-CBQV 6 MG/.6ML
6 INJECTION, SOLUTION SUBCUTANEOUS ONCE
Refills: 0 | Status: COMPLETED | OUTPATIENT
Start: 2020-01-23 | End: 2020-01-23

## 2020-01-23 RX ORDER — PACLITAXEL 6 MG/ML
110 INJECTION, SOLUTION, CONCENTRATE INTRAVENOUS ONCE
Refills: 0 | Status: COMPLETED | OUTPATIENT
Start: 2020-01-23 | End: 2020-01-23

## 2020-01-23 RX ORDER — CARBOPLATIN 50 MG
155 VIAL (EA) INTRAVENOUS ONCE
Refills: 0 | Status: COMPLETED | OUTPATIENT
Start: 2020-01-23 | End: 2020-01-23

## 2020-01-23 RX ORDER — DIPHENHYDRAMINE HCL 50 MG
50 CAPSULE ORAL ONCE
Refills: 0 | Status: COMPLETED | OUTPATIENT
Start: 2020-01-23 | End: 2020-01-23

## 2020-01-23 RX ADMIN — FAMOTIDINE 20 MILLIGRAM(S): 10 INJECTION INTRAVENOUS at 14:04

## 2020-01-23 RX ADMIN — FAMOTIDINE 156 MILLIGRAM(S): 10 INJECTION INTRAVENOUS at 13:45

## 2020-01-23 RX ADMIN — Medication 265.5 MILLIGRAM(S): at 15:05

## 2020-01-23 RX ADMIN — PACLITAXEL 268.33 MILLIGRAM(S): 6 INJECTION, SOLUTION, CONCENTRATE INTRAVENOUS at 14:05

## 2020-01-23 RX ADMIN — Medication 183 MILLIGRAM(S): at 13:03

## 2020-01-23 RX ADMIN — PACLITAXEL 110 MILLIGRAM(S): 6 INJECTION, SOLUTION, CONCENTRATE INTRAVENOUS at 15:05

## 2020-01-23 RX ADMIN — Medication 12 MILLIGRAM(S): at 13:25

## 2020-01-23 RX ADMIN — Medication 50 MILLIGRAM(S): at 13:45

## 2020-01-23 RX ADMIN — Medication 155 MILLIGRAM(S): at 16:05

## 2020-01-23 RX ADMIN — Medication 153 MILLIGRAM(S): at 13:25

## 2020-01-23 RX ADMIN — PEGFILGRASTIM-CBQV 6 MILLIGRAM(S): 6 INJECTION, SOLUTION SUBCUTANEOUS at 15:08

## 2020-01-25 LAB
HCT VFR BLD CALC: 30.5 %
HGB BLD-MCNC: 9.9 G/DL
MCHC RBC-ENTMCNC: 31.3 PG
MCHC RBC-ENTMCNC: 32.5 G/DL
MCV RBC AUTO: 96.5 FL
PLATELET # BLD AUTO: 108 K/UL
PMV BLD: 11.5 FL
RBC # BLD: 3.16 M/UL
RBC # FLD: 16.5 %
WBC # FLD AUTO: 9.08 K/UL

## 2020-02-06 ENCOUNTER — LABORATORY RESULT (OUTPATIENT)
Age: 85
End: 2020-02-06

## 2020-02-06 ENCOUNTER — NON-APPOINTMENT (OUTPATIENT)
Age: 85
End: 2020-02-06

## 2020-02-06 ENCOUNTER — APPOINTMENT (OUTPATIENT)
Dept: INFUSION THERAPY | Facility: CLINIC | Age: 85
End: 2020-02-06
Payer: MEDICARE

## 2020-02-06 ENCOUNTER — APPOINTMENT (OUTPATIENT)
Dept: HEMATOLOGY ONCOLOGY | Facility: CLINIC | Age: 85
End: 2020-02-06
Payer: MEDICARE

## 2020-02-06 VITALS
TEMPERATURE: 98 F | HEART RATE: 65 BPM | SYSTOLIC BLOOD PRESSURE: 184 MMHG | HEIGHT: 66 IN | WEIGHT: 293 LBS | DIASTOLIC BLOOD PRESSURE: 74 MMHG | BODY MASS INDEX: 47.09 KG/M2

## 2020-02-06 LAB
HCT VFR BLD CALC: 31.1 %
HGB BLD-MCNC: 10.3 G/DL
MCHC RBC-ENTMCNC: 32.2 PG
MCHC RBC-ENTMCNC: 33.1 G/DL
MCV RBC AUTO: 97.2 FL
PLATELET # BLD AUTO: 246 K/UL
PMV BLD: 11 FL
RBC # BLD: 3.2 M/UL
RBC # FLD: 17.3 %
WBC # FLD AUTO: 29.72 K/UL

## 2020-02-06 PROCEDURE — 99215 OFFICE O/P EST HI 40 MIN: CPT

## 2020-02-06 RX ORDER — CARBOPLATIN 50 MG
155 VIAL (EA) INTRAVENOUS ONCE
Refills: 0 | Status: COMPLETED | OUTPATIENT
Start: 2020-02-06 | End: 2020-02-06

## 2020-02-06 RX ORDER — DIPHENHYDRAMINE HCL 50 MG
50 CAPSULE ORAL ONCE
Refills: 0 | Status: COMPLETED | OUTPATIENT
Start: 2020-02-06 | End: 2020-02-06

## 2020-02-06 RX ORDER — FAMOTIDINE 10 MG/ML
20 INJECTION INTRAVENOUS ONCE
Refills: 0 | Status: COMPLETED | OUTPATIENT
Start: 2020-02-06 | End: 2020-02-06

## 2020-02-06 RX ORDER — DEXAMETHASONE 0.5 MG/5ML
12 ELIXIR ORAL ONCE
Refills: 0 | Status: COMPLETED | OUTPATIENT
Start: 2020-02-06 | End: 2020-02-06

## 2020-02-06 RX ORDER — PACLITAXEL 6 MG/ML
110 INJECTION, SOLUTION, CONCENTRATE INTRAVENOUS ONCE
Refills: 0 | Status: COMPLETED | OUTPATIENT
Start: 2020-02-06 | End: 2020-02-06

## 2020-02-06 RX ADMIN — Medication 50 MILLIGRAM(S): at 15:20

## 2020-02-06 RX ADMIN — Medication 12 MILLIGRAM(S): at 14:40

## 2020-02-06 RX ADMIN — Medication 183 MILLIGRAM(S): at 14:19

## 2020-02-06 RX ADMIN — Medication 265.5 MILLIGRAM(S): at 16:20

## 2020-02-06 RX ADMIN — Medication 153 MILLIGRAM(S): at 15:00

## 2020-02-06 RX ADMIN — PACLITAXEL 268.33 MILLIGRAM(S): 6 INJECTION, SOLUTION, CONCENTRATE INTRAVENOUS at 15:20

## 2020-02-06 RX ADMIN — Medication 155 MILLIGRAM(S): at 17:24

## 2020-02-06 RX ADMIN — PACLITAXEL 110 MILLIGRAM(S): 6 INJECTION, SOLUTION, CONCENTRATE INTRAVENOUS at 16:20

## 2020-02-06 RX ADMIN — FAMOTIDINE 20 MILLIGRAM(S): 10 INJECTION INTRAVENOUS at 15:00

## 2020-02-06 RX ADMIN — FAMOTIDINE 156 MILLIGRAM(S): 10 INJECTION INTRAVENOUS at 14:40

## 2020-02-09 LAB
ALBUMIN SERPL ELPH-MCNC: 3.6 G/DL
ALP BLD-CCNC: 140 U/L
ALT SERPL-CCNC: <5 U/L
ANION GAP SERPL CALC-SCNC: 15 MMOL/L
AST SERPL-CCNC: 7 U/L
BILIRUB SERPL-MCNC: 0.3 MG/DL
BUN SERPL-MCNC: 18 MG/DL
CALCIUM SERPL-MCNC: 8.7 MG/DL
CANCER AG125 SERPL-ACNC: 42 U/ML
CHLORIDE SERPL-SCNC: 103 MMOL/L
CO2 SERPL-SCNC: 25 MMOL/L
CREAT SERPL-MCNC: 1 MG/DL
GLUCOSE SERPL-MCNC: 104 MG/DL
POTASSIUM SERPL-SCNC: 3.8 MMOL/L
PROT SERPL-MCNC: 6.2 G/DL
SODIUM SERPL-SCNC: 143 MMOL/L

## 2020-02-09 NOTE — PHYSICAL EXAM
[Ambulatory and capable of all self care but unable to carry out any work activities] : Status 2- Ambulatory and capable of all self care but unable to carry out any work activities. Up and about more than 50% of waking hours [Normal] : no peripheral adenopathy appreciated [de-identified] : kyphosis [de-identified] : bradycardic [de-identified] : flat affect [de-identified] : sitting in wheelchair, appears weak, pale

## 2020-02-09 NOTE — REVIEW OF SYSTEMS
[Fatigue] : fatigue [Incontinence] : incontinence [Difficulty Walking] : difficulty walking [Depression] : depression [Negative] : Heme/Lymph [Abdominal Pain] : no abdominal pain [Dysmenorrhea/Abn Vaginal Bleeding] : no dysmenorrhea/abnormal vaginal bleeding [de-identified] : R-sided residual weakness secondary to CVA [Joint Pain] : no joint pain

## 2020-02-09 NOTE — HISTORY OF PRESENT ILLNESS
[de-identified] : 84 yof, PMH of CVA with R-sided residual weakness (2017), DM II, HTN, HLD, GERD, rheumatoid arthritis, and osteoarthritis was having lower abdominal pain.  CT abdomen/pelvis showed 3.3 cm endometrial thickening and pelvic LAD.  Patient had never had any postmenopausal bleeding.  She saw gynecologic oncology and underwent exam under anesthesia with dilation and curettage.  Pathology report demonstrated high-grade adenocarcinoma involving cervix and endocervix.  Endometrium could not assessed secondary to complete obstruction by tumor.  Tumor is p16 positive and without MMR deficiency.\par \par At baseline, patient lives alone and uses a walker around the home and a wheelchair outside the home.  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  Her two sons live nearby and will be transporting her to medical appointments. [de-identified] : 9/25/19\par pt is here for follow up.\par She has completed the PET scan and wants to discuss further plan for care.\par She still remains active.\par C/o abdominal pain for which she is on Tramadol as prescribed by her PCP,\par No vaginal bleeding\par \par 10/9/19\par Pt is here for follow up.\par She s/p 1 treatment with carboplatin and taxol.\par She tolerated the treatment well except for 2 episodes of diarrhea.\par pt has been using oxycodone/ acetaminophen 5/325mg 1 tab q 6 hours with relief of symptoms. Requesting another script.\par Denies any significant nausea or vomiting.\par Has poor appetite. Does not like to drink ensure\par \par 10/23/19\par Pt is here for follow up.\par Since last visit she had port placement after which she had an episode of vomiting.\par Her abdominal pain has not changed.\par She has been having constipation X 7 days.\par She states she took a stool softener x 3 times without relief.\par She is able to pass flatus.\par appetite is not improving.\par She has been using oxycodone 5 mg as needed with relief.\par \par 11/13/19\par Pt is here for follow up.\par States the pain is well controlled. She saw a pain Mgt specialist and she is on oxycodone and marinol. The appetite is sometimes improved.\par However she has lost 9 lb since last visit.\par She has a home aide 3 times a week. There is some family support.\par No N, V, D\par \par 12/4/19\par pt is here for follow up.\par Saw Dr Lopez today and may be switching to Fentanyl. \par States that she has decreased appetite, she has not been taking marinol daily.\par She has been having diarrhea, did not take imodium as advised.\par No fever.\par has mild nausea and few episodes of vomiting.\par She has vaginal spotting off and on.\par \par \par 12/16/19\par Patient presents to the office for scheduled chemotherapy.  She missed last week's appointment (Cycle 4, Day 8), due to multiple complaints.  She is accompanied by 3 family members who report she has not been herself over the last 2 weeks.  She has not been eating or drinking much.  She has been very weak and fatigued.  There are days when she won't even speak or respond.  She had been getting PT at home, but she refused to continue.  She has not been taking Marinol BID as prescribed.  Patient states she does not want to stop treatment, she just feels "run down".  Patient denies cough, shortness of breath, denies fever, denies bone pain.  Patient denies abdominal pain or bloating, denies vaginal bleeding or discharge.\par \par 1/16/20\par Pt is here for follow up.\par pt is feeling better.\par Her appetite has improved. She is now on Marinol 10 mg bid prescribed by her pain Mgt specialist.\par Pt is in her bed most of the time.\par She is not getting out of the bed not even to use the bathroom.\par  Patient denies abdominal pain or bloating, denies vaginal bleeding or discharge.\par  Patient denies cough, shortness of breath, denies fever, denies bone pain\par She has not participated in PT for a long time.\par Pt has not needed pain meds as much as before.\par \par 2/6/20\par Pt is here for follow up.\par C/o weakness, not eating well.\par Pt has not been getting up to walk. She has been spending most of her waking hours in bed.\par The physical therapist came only three times.\par  Patient denies cough, shortness of breath, denies fever, denies bone pain\par

## 2020-02-09 NOTE — ASSESSMENT
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 left 5th rib.\par \par Pt has an ECOG of 2-3.\par \par Pt was started on dose reduced weekly Carboplatin and Taxol s/p 6 cycles with interim PET scan showing a favorable response.\par \par \par RECOMMENDATIONS\par \par I had a detailed discussion with the patient and her family.  \par \par Pt will continue with chemotherapy with Carboplatin and Taxol. proceed with Cycle # 6\par Given that she has a borderline performance status chemotherapy dose will be reduced and given on a weekly schedule. The side effects from chemotherapy including, but not limited to, nausea, vomiting, diarrhea, myelosuppression, risk of infection, neuropathy, infusion reactions, have all been discussed with the patient and her family. \par \par The chemotherapy dose was adjusted according to pt's height, weight, labs and anticipated tolerance.\par The high risk of complications and complexity associated with antineoplastic therapy administration has been explained to the pt and family.\par Chemotherapy will be administered under my direct supervision\par \par I discussed management of common side effects with the pt and her family. \par Prognosis was also discussed at length.\par Had a separate discussion with pt's family. They seem to be overwhelmed with responsibilities of pt care. Offered  follow up,\par For now they have agreed to continue with chemo\par \par All the questions and concerns addressed by the patient and her family were addressed to their satisfaction.\par Patient will follow with pain management service.\par \par Pt and family made aware of high risk of falls in an elderly pt who is frail especially with opioids.\par   evaluated her today and now her daughter is her HCP\par \par Encouraged pt to improve nutrition. \par \par

## 2020-02-12 RX ADMIN — Medication 50 MILLIGRAM(S): at 11:34

## 2020-02-13 ENCOUNTER — LABORATORY RESULT (OUTPATIENT)
Age: 85
End: 2020-02-13

## 2020-02-13 ENCOUNTER — APPOINTMENT (OUTPATIENT)
Dept: INFUSION THERAPY | Facility: CLINIC | Age: 85
End: 2020-02-13

## 2020-02-13 PROCEDURE — 88360 TUMOR IMMUNOHISTOCHEM/MANUAL: CPT | Mod: 26

## 2020-02-13 RX ORDER — PEGFILGRASTIM-CBQV 6 MG/.6ML
6 INJECTION, SOLUTION SUBCUTANEOUS ONCE
Refills: 0 | Status: COMPLETED | OUTPATIENT
Start: 2020-02-13 | End: 2020-02-13

## 2020-02-13 RX ORDER — DIPHENHYDRAMINE HCL 50 MG
50 CAPSULE ORAL ONCE
Refills: 0 | Status: COMPLETED | OUTPATIENT
Start: 2020-02-13 | End: 2020-02-13

## 2020-02-13 RX ORDER — CARBOPLATIN 50 MG
155 VIAL (EA) INTRAVENOUS ONCE
Refills: 0 | Status: COMPLETED | OUTPATIENT
Start: 2020-02-13 | End: 2020-02-13

## 2020-02-13 RX ORDER — FAMOTIDINE 10 MG/ML
20 INJECTION INTRAVENOUS ONCE
Refills: 0 | Status: COMPLETED | OUTPATIENT
Start: 2020-02-13 | End: 2020-02-13

## 2020-02-13 RX ORDER — PACLITAXEL 6 MG/ML
110 INJECTION, SOLUTION, CONCENTRATE INTRAVENOUS ONCE
Refills: 0 | Status: COMPLETED | OUTPATIENT
Start: 2020-02-13 | End: 2020-02-13

## 2020-02-13 RX ORDER — DEXAMETHASONE 0.5 MG/5ML
12 ELIXIR ORAL ONCE
Refills: 0 | Status: COMPLETED | OUTPATIENT
Start: 2020-02-13 | End: 2020-02-13

## 2020-02-13 RX ADMIN — FAMOTIDINE 156 MILLIGRAM(S): 10 INJECTION INTRAVENOUS at 12:10

## 2020-02-13 RX ADMIN — Medication 155 MILLIGRAM(S): at 14:38

## 2020-02-13 RX ADMIN — PACLITAXEL 110 MILLIGRAM(S): 6 INJECTION, SOLUTION, CONCENTRATE INTRAVENOUS at 13:36

## 2020-02-13 RX ADMIN — Medication 153 MILLIGRAM(S): at 11:50

## 2020-02-13 RX ADMIN — Medication 265.5 MILLIGRAM(S): at 13:40

## 2020-02-13 RX ADMIN — Medication 183 MILLIGRAM(S): at 11:33

## 2020-02-13 RX ADMIN — PACLITAXEL 268.33 MILLIGRAM(S): 6 INJECTION, SOLUTION, CONCENTRATE INTRAVENOUS at 12:33

## 2020-02-13 RX ADMIN — PEGFILGRASTIM-CBQV 6 MILLIGRAM(S): 6 INJECTION, SOLUTION SUBCUTANEOUS at 13:38

## 2020-02-13 RX ADMIN — Medication 12 MILLIGRAM(S): at 11:50

## 2020-02-13 RX ADMIN — FAMOTIDINE 20 MILLIGRAM(S): 10 INJECTION INTRAVENOUS at 12:30

## 2020-02-14 LAB
HCT VFR BLD CALC: 26.1 %
HGB BLD-MCNC: 8.5 G/DL
MCHC RBC-ENTMCNC: 32.1 PG
MCHC RBC-ENTMCNC: 32.6 G/DL
MCV RBC AUTO: 98.5 FL
PLATELET # BLD AUTO: 130 K/UL
PMV BLD: 11.5 FL
RBC # BLD: 2.65 M/UL
RBC # FLD: 16.7 %
WBC # FLD AUTO: 14.14 K/UL

## 2020-02-18 ENCOUNTER — FORM ENCOUNTER (OUTPATIENT)
Age: 85
End: 2020-02-18

## 2020-02-19 ENCOUNTER — OUTPATIENT (OUTPATIENT)
Dept: OUTPATIENT SERVICES | Facility: HOSPITAL | Age: 85
LOS: 1 days | Discharge: HOME | End: 2020-02-19
Payer: MEDICARE

## 2020-02-19 DIAGNOSIS — Z90.89 ACQUIRED ABSENCE OF OTHER ORGANS: Chronic | ICD-10-CM

## 2020-02-19 DIAGNOSIS — I82.0 BUDD-CHIARI SYNDROME: ICD-10-CM

## 2020-02-19 DIAGNOSIS — Z92.89 PERSONAL HISTORY OF OTHER MEDICAL TREATMENT: Chronic | ICD-10-CM

## 2020-02-19 DIAGNOSIS — Z98.51 TUBAL LIGATION STATUS: Chronic | ICD-10-CM

## 2020-02-19 LAB — GLUCOSE BLDC GLUCOMTR-MCNC: 138 MG/DL — HIGH (ref 70–99)

## 2020-02-19 PROCEDURE — 78815 PET IMAGE W/CT SKULL-THIGH: CPT | Mod: 26,PS

## 2020-02-27 ENCOUNTER — APPOINTMENT (OUTPATIENT)
Dept: INFUSION THERAPY | Facility: CLINIC | Age: 85
End: 2020-02-27
Payer: MEDICARE

## 2020-02-27 ENCOUNTER — LABORATORY RESULT (OUTPATIENT)
Age: 85
End: 2020-02-27

## 2020-02-27 ENCOUNTER — APPOINTMENT (OUTPATIENT)
Dept: HEMATOLOGY ONCOLOGY | Facility: CLINIC | Age: 85
End: 2020-02-27
Payer: MEDICARE

## 2020-02-27 VITALS
HEIGHT: 66 IN | TEMPERATURE: 97.6 F | WEIGHT: 115 LBS | DIASTOLIC BLOOD PRESSURE: 52 MMHG | SYSTOLIC BLOOD PRESSURE: 110 MMHG | BODY MASS INDEX: 18.48 KG/M2 | HEART RATE: 58 BPM

## 2020-02-27 DIAGNOSIS — C54.1 MALIGNANT NEOPLASM OF ENDOMETRIUM: ICD-10-CM

## 2020-02-27 DIAGNOSIS — Z51.11 ENCOUNTER FOR ANTINEOPLASTIC CHEMOTHERAPY: ICD-10-CM

## 2020-02-27 DIAGNOSIS — E83.42 HYPOMAGNESEMIA: ICD-10-CM

## 2020-02-27 DIAGNOSIS — D64.9 ANEMIA, UNSPECIFIED: ICD-10-CM

## 2020-02-27 LAB
ANION GAP SERPL CALC-SCNC: 15 MMOL/L
BUN SERPL-MCNC: 22 MG/DL
CALCIUM SERPL-MCNC: 8.7 MG/DL
CHLORIDE SERPL-SCNC: 101 MMOL/L
CO2 SERPL-SCNC: 25 MMOL/L
CREAT SERPL-MCNC: 1.1 MG/DL
GLUCOSE SERPL-MCNC: 129 MG/DL
HCT VFR BLD CALC: 23.3 %
HGB BLD-MCNC: 7.6 G/DL
MAGNESIUM SERPL-MCNC: 1.2 MG/DL
MCHC RBC-ENTMCNC: 32.6 G/DL
MCHC RBC-ENTMCNC: 33 PG
MCV RBC AUTO: 101.3 FL
PLATELET # BLD AUTO: 156 K/UL
PMV BLD: 11.6 FL
POTASSIUM SERPL-SCNC: 3.9 MMOL/L
RBC # BLD: 2.3 M/UL
RBC # FLD: 18.6 %
SODIUM SERPL-SCNC: 141 MMOL/L
WBC # FLD AUTO: 24.58 K/UL

## 2020-02-27 PROCEDURE — 99215 OFFICE O/P EST HI 40 MIN: CPT

## 2020-02-27 RX ORDER — SODIUM CHLORIDE 9 MG/ML
500 INJECTION INTRAMUSCULAR; INTRAVENOUS; SUBCUTANEOUS
Refills: 0 | Status: DISCONTINUED | OUTPATIENT
Start: 2020-02-27 | End: 2020-06-06

## 2020-02-27 RX ORDER — MAGNESIUM SULFATE 500 MG/ML
2 VIAL (ML) INJECTION ONCE
Refills: 0 | Status: COMPLETED | OUTPATIENT
Start: 2020-02-27 | End: 2020-02-27

## 2020-02-27 RX ADMIN — SODIUM CHLORIDE 125 MILLILITER(S): 9 INJECTION INTRAMUSCULAR; INTRAVENOUS; SUBCUTANEOUS at 12:14

## 2020-02-27 RX ADMIN — Medication 50 GRAM(S): at 14:16

## 2020-02-27 NOTE — RESULTS/DATA
[FreeTextEntry1] : EXAM: PETCT KRISTEL ONC FDG SUBS \par PROCEDURE DATE: 02/19/2020 \par INTERPRETATION: FDG PET CT STUDY, SUBSEQUENT TREATMENT STRATEGY \par REASON: TUMOR IMAGING - PET with concurrently acquired CT for attenuation correction and anatomic \par localization; skull base to mid - thigh / [id number] \par HISTORY: \par 85-year-old female with history of metastatic endometrial cancer with metastasis to liver, lymph nodes and \par peritoneum/omentum and questionable left fifth rib. Last chemotherapy reported, February 20, 2020. No \par history of radiation therapy. \par Prior PET/CT dated November 26, 2019 with the following impression; \par 1. No new sites of pathologic FDG uptake. \par 2. Marked interval decrease in extent of FDG avid uterine mass, now with \par residual focal mild FDG uptake, max SUV 5.3, previously 20.3 (74% decrease). \par 3. Interval resolution of hepatic metastases to non-FDG avid status. No \par abnormal hepatic radiotracer uptake. \par 4. Interval resolution of FDG avid pelvic lymphadenopathy. \par 5. Interval resolution of FDG avid peritoneal implants and omental \par nodularity, with minimal residual non-FDG avid scarring or treated lesions. \par 6. Interval resolution of left anterior 5th rib sclerotic lesion to non-FDG \par avid status. \par Blood glucose pre injection 138 mg/dL \par TECHNIQUE: Approximately 45 minutes after the intravenous administration of 9.7 mCi 18-Fluorine FDG, whole \par body PET images were acquired from base of skull to mid - thigh. In addition, non-contrast, low dose, non - \par diagnostic CT was acquired for attenuation correction and anatomic correlation purposes only.These images reveal no sites of pathologic FDG uptake suspicious for biologic tumor activity and therefore \par compared to 11/26/2019 no new sites of pathologic FDG uptake. \par Interval resolution of abnormal FDG uptake within the uterine mass consistent with good treatment response. \par There is normal variant diffuse uptake throughout the bony skeleton consistent with post treatment response. \par Additional CT Findings: There are complete resolution of right pleural effusion. Decrease in left pleural effusion \par now small. 2 mm nodule in the right lower lobe, not well evaluated on prior exam secondary to effusion. \par Otherwise, no significant change since prior exam. \par IMPRESSION: \par No sites of pathologic FDG uptake suspicious for biologic tumor activity and therefore compared to 11/26/2019 \par no new sites of pathologic FDG uptake. \par Interval resolution of abnormal FDG uptake within the uterine mass consistent with good treatment response.

## 2020-02-27 NOTE — ASSESSMENT
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 left 5th rib.\par \par Pt has an ECOG of 2-3.\par \par Pt was started on dose reduced weekly Carboplatin and Taxol s/p 7 cycles with  PET scan showing a favorable response.\par Pt appears very depressed.\par Anorexia/ wt loss likely related to depression\par \par RECOMMENDATIONS\par \par I had a detailed discussion with the patient and her family.  \par Pt seems very depressed and that may be the reason for her anorexia and weight loss. She has been requiring less pain meds since last month.\par I do not believe that the chemo alone is responsible for her anorexia.\par \par I advised she can take a break from chemo since disease is under control.\par Will send STAT BMP and MG and give IV fluids\par Pt advised to see a psychiatrist [facility]SA[facility],  contacted and situation discussed with her.\par \par Also will transfuse 1 unit PRBCs.\par IV magnesium today.\par \par Had a long discussion with the pt's daughter and three sons ( one on the phone). Discussed that if the pt wants to stop treatment and pursue hospice, it is quite appropriate. However would advise a psych eval before making a decision regarding hospice. Previously pt has expressed desire to continue treatment.\par \par All the questions and concerns addressed by the patient and her family were addressed to their satisfaction.\par Patient will follow with pain management service.\par \par Pt and family made aware of high risk of falls in an elderly pt who is frail especially with opioids.\par \par Encouraged pt to improve nutrition. \par RTC in 3 weeks\par \par

## 2020-02-27 NOTE — OB HISTORY
[Currently In Menopause] : currently in menopause [Menarche Age: ____] : age at menarche was [unfilled] [Menopause Age: ____] : age at menopause was [unfilled] [___] : Full Term: [unfilled]

## 2020-02-27 NOTE — HISTORY OF PRESENT ILLNESS
[de-identified] : 85 yof, PMH of CVA with R-sided residual weakness (2017), DM II, HTN, HLD, GERD, rheumatoid arthritis, and osteoarthritis was having lower abdominal pain.  CT abdomen/pelvis showed 3.3 cm endometrial thickening and pelvic LAD.  Patient had never had any postmenopausal bleeding.  She saw gynecologic oncology and underwent exam under anesthesia with dilation and curettage.  Pathology report demonstrated high-grade adenocarcinoma involving cervix and endocervix.  Endometrium could not assessed secondary to complete obstruction by tumor.  Tumor is p16 positive and without MMR deficiency.\par \par At baseline, patient lives alone and uses a walker around the home and a wheelchair outside the home.  She employs someone to clean her home and has meals delivered but used to perform other day-to-day activities by herself.  Her two sons live nearby and will be transporting her to medical appointments. [de-identified] : 9/25/19\par pt is here for follow up.\par She has completed the PET scan and wants to discuss further plan for care.\par She still remains active.\par C/o abdominal pain for which she is on Tramadol as prescribed by her PCP,\par No vaginal bleeding\par \par 10/9/19\par Pt is here for follow up.\par She s/p 1 treatment with carboplatin and taxol.\par She tolerated the treatment well except for 2 episodes of diarrhea.\par pt has been using oxycodone/ acetaminophen 5/325mg 1 tab q 6 hours with relief of symptoms. Requesting another script.\par Denies any significant nausea or vomiting.\par Has poor appetite. Does not like to drink ensure\par \par 10/23/19\par Pt is here for follow up.\par Since last visit she had port placement after which she had an episode of vomiting.\par Her abdominal pain has not changed.\par She has been having constipation X 7 days.\par She states she took a stool softener x 3 times without relief.\par She is able to pass flatus.\par appetite is not improving.\par She has been using oxycodone 5 mg as needed with relief.\par \par 11/13/19\par Pt is here for follow up.\par States the pain is well controlled. She saw a pain Mgt specialist and she is on oxycodone and marinol. The appetite is sometimes improved.\par However she has lost 9 lb since last visit.\par She has a home aide 3 times a week. There is some family support.\par No N, V, D\par \par 12/4/19\par pt is here for follow up.\par Saw Dr Lopez today and may be switching to Fentanyl. \par States that she has decreased appetite, she has not been taking marinol daily.\par She has been having diarrhea, did not take imodium as advised.\par No fever.\par has mild nausea and few episodes of vomiting.\par She has vaginal spotting off and on.\par \par \par 12/16/19\par Patient presents to the office for scheduled chemotherapy.  She missed last week's appointment (Cycle 4, Day 8), due to multiple complaints.  She is accompanied by 3 family members who report she has not been herself over the last 2 weeks.  She has not been eating or drinking much.  She has been very weak and fatigued.  There are days when she won't even speak or respond.  She had been getting PT at home, but she refused to continue.  She has not been taking Marinol BID as prescribed.  Patient states she does not want to stop treatment, she just feels "run down".  Patient denies cough, shortness of breath, denies fever, denies bone pain.  Patient denies abdominal pain or bloating, denies vaginal bleeding or discharge.\par \par 1/16/20\par Pt is here for follow up.\par pt is feeling better.\par Her appetite has improved. She is now on Marinol 10 mg bid prescribed by her pain Mgt specialist.\par Pt is in her bed most of the time.\par She is not getting out of the bed not even to use the bathroom.\par  Patient denies abdominal pain or bloating, denies vaginal bleeding or discharge.\par  Patient denies cough, shortness of breath, denies fever, denies bone pain\par She has not participated in PT for a long time.\par Pt has not needed pain meds as much as before.\par \par 2/6/20\par Pt is here for follow up.\par C/o weakness, not eating well.\par Pt has not been getting up to walk. She has been spending most of her waking hours in bed.\par The physical therapist came only three times.\par  Patient denies cough, shortness of breath, denies fever, denies bone pain\par \par 2/27/20\par Pt is here for follow up.\par Pt 's family is Complaining that she is not eating much and has lost a significant amount of weight since last visit.\par She does not have an appetite, She is not willing to drink fluids either.\par Pt has been trying marinol also and despite that she has no desire to eat.\par Pt is very quiet and answers questions appropriately however will not provide any details.\par The family seems frustrated that she is not agreeing to eat and drink.\par They are asking if she is appropriate for hospice.\par Pt had a PET scan since last follow up.\par Denies vaginal or rectal bleeding\par

## 2020-02-27 NOTE — REVIEW OF SYSTEMS
[Fatigue] : fatigue [Incontinence] : incontinence [Difficulty Walking] : difficulty walking [Depression] : depression [Negative] : Psychiatric [Abdominal Pain] : no abdominal pain [Dysmenorrhea/Abn Vaginal Bleeding] : no dysmenorrhea/abnormal vaginal bleeding [Joint Pain] : no joint pain [de-identified] : R-sided residual weakness secondary to CVA

## 2020-02-27 NOTE — PHYSICAL EXAM
[Ambulatory and capable of all self care but unable to carry out any work activities] : Status 2- Ambulatory and capable of all self care but unable to carry out any work activities. Up and about more than 50% of waking hours [Normal] : no peripheral adenopathy appreciated [de-identified] : sitting in wheelchair, appears weak, pale, not in distress , however not participating in the conversation [de-identified] : kyphosis [de-identified] : flat affect

## 2020-02-27 NOTE — CONSULT LETTER
[Consult Letter:] : I had the pleasure of evaluating your patient, [unfilled]. [Dear  ___] : Dear  [unfilled], [Please see my note below.] : Please see my note below. [Consult Closing:] : Thank you very much for allowing me to participate in the care of this patient.  If you have any questions, please do not hesitate to contact me. [Sincerely,] : Sincerely, [DrAngel ___] : Dr. BERG [DrAngel  ___] : Dr. BERG [FreeTextEntry3] : Keturah Sneed MD\par Attending Physician\par Cancer Services\par [facility]\par

## 2020-02-28 ENCOUNTER — NON-APPOINTMENT (OUTPATIENT)
Age: 85
End: 2020-02-28

## 2020-02-29 LAB
ABO + RH PNL BLD: NORMAL
BLD GP AB SCN SERPL QL: NORMAL
CANCER AG125 SERPL-ACNC: 35 U/ML

## 2020-03-02 ENCOUNTER — APPOINTMENT (OUTPATIENT)
Dept: HEMATOLOGY ONCOLOGY | Facility: CLINIC | Age: 85
End: 2020-03-02

## 2020-03-02 ENCOUNTER — LABORATORY RESULT (OUTPATIENT)
Age: 85
End: 2020-03-02

## 2020-03-02 ENCOUNTER — NON-APPOINTMENT (OUTPATIENT)
Age: 85
End: 2020-03-02

## 2020-03-02 ENCOUNTER — OUTPATIENT (OUTPATIENT)
Dept: OUTPATIENT SERVICES | Facility: HOSPITAL | Age: 85
LOS: 1 days | Discharge: HOME | End: 2020-03-02

## 2020-03-02 DIAGNOSIS — Z98.51 TUBAL LIGATION STATUS: Chronic | ICD-10-CM

## 2020-03-02 DIAGNOSIS — C77.2 SECONDARY AND UNSPECIFIED MALIGNANT NEOPLASM OF INTRA-ABDOMINAL LYMPH NODES: ICD-10-CM

## 2020-03-02 DIAGNOSIS — Z51.11 ENCOUNTER FOR ANTINEOPLASTIC CHEMOTHERAPY: ICD-10-CM

## 2020-03-02 DIAGNOSIS — C78.6 SECONDARY MALIGNANT NEOPLASM OF RETROPERITONEUM AND PERITONEUM: ICD-10-CM

## 2020-03-02 DIAGNOSIS — Z92.89 PERSONAL HISTORY OF OTHER MEDICAL TREATMENT: Chronic | ICD-10-CM

## 2020-03-02 DIAGNOSIS — Z90.89 ACQUIRED ABSENCE OF OTHER ORGANS: Chronic | ICD-10-CM

## 2020-03-02 DIAGNOSIS — C54.1 MALIGNANT NEOPLASM OF ENDOMETRIUM: ICD-10-CM

## 2020-03-02 LAB
HCT VFR BLD CALC: 26.6 %
HGB BLD-MCNC: 8.5 G/DL
MCHC RBC-ENTMCNC: 32 G/DL
MCHC RBC-ENTMCNC: 32.9 PG
MCV RBC AUTO: 103.1 FL
PLATELET # BLD AUTO: 206 K/UL
PMV BLD: 10.4 FL
RBC # BLD: 2.58 M/UL
RBC # FLD: 19.3 %
WBC # FLD AUTO: 23.3 K/UL

## 2020-03-03 ENCOUNTER — APPOINTMENT (OUTPATIENT)
Dept: INFUSION THERAPY | Facility: CLINIC | Age: 85
End: 2020-03-03

## 2020-03-03 LAB
ABO + RH PNL BLD: NORMAL
BLD GP AB SCN SERPL QL: NORMAL

## 2020-03-05 ENCOUNTER — APPOINTMENT (OUTPATIENT)
Dept: INFUSION THERAPY | Facility: CLINIC | Age: 85
End: 2020-03-05

## 2020-03-26 ENCOUNTER — APPOINTMENT (OUTPATIENT)
Dept: INFUSION THERAPY | Facility: CLINIC | Age: 85
End: 2020-03-26

## 2020-04-10 ENCOUNTER — APPOINTMENT (OUTPATIENT)
Dept: HEMATOLOGY ONCOLOGY | Facility: CLINIC | Age: 85
End: 2020-04-10
Payer: MEDICARE

## 2020-04-10 DIAGNOSIS — C78.7 SECONDARY MALIGNANT NEOPLASM OF LIVER AND INTRAHEPATIC BILE DUCT: ICD-10-CM

## 2020-04-10 DIAGNOSIS — C54.1 MALIGNANT NEOPLASM OF ENDOMETRIUM: ICD-10-CM

## 2020-04-10 DIAGNOSIS — C78.6 SECONDARY MALIGNANT NEOPLASM OF RETROPERITONEUM AND PERITONEUM: ICD-10-CM

## 2020-04-10 DIAGNOSIS — C77.2 SECONDARY AND UNSPECIFIED MALIGNANT NEOPLASM OF INTRA-ABDOMINAL LYMPH NODES: ICD-10-CM

## 2020-04-10 DIAGNOSIS — C80.1 SECONDARY MALIGNANT NEOPLASM OF RETROPERITONEUM AND PERITONEUM: ICD-10-CM

## 2020-04-10 PROCEDURE — 99215 OFFICE O/P EST HI 40 MIN: CPT | Mod: 95

## 2020-04-10 NOTE — PHYSICAL EXAM
[Completely disabled. Cannot carry on any self care. Totally confined to bed or chair] : Status 4- Completely disabled. Cannot carry on any self care. Totally confined to bed or chair [de-identified] : not in distress, opens eyes to verbal commands but not willing to converse. No respiratory distress [de-identified] : daughter palpated her abdomen while on video no tenderness noted although daughter states that abdomen was not soft. [de-identified] : moving arms [de-identified] : appears depressed

## 2020-04-10 NOTE — HISTORY OF PRESENT ILLNESS
[Home] : at home, [unfilled] , at the time of the visit. [Other Location: e.g. Home (Enter Location, City,State)___] : at [unfilled] [Family Member] : family member [FreeTextEntry2] : Son [FreeTextEntry4] : Daughter [de-identified] : 85 yof, PMH of CVA with R-sided residual weakness (2017), DM II, HTN, HLD, GERD, rheumatoid arthritis, and osteoarthritis was having lower abdominal pain.  CT abdomen/pelvis showed 3.3 cm endometrial thickening and pelvic LAD.  Patient had never had any postmenopausal bleeding.  She saw gynecologic oncology and underwent exam under anesthesia with dilation and curettage.  Pathology report demonstrated high-grade adenocarcinoma involving cervix and endocervix.  Endometrium could not assessed secondary to complete obstruction by tumor.  Tumor is p16 positive and without MMR deficiency.\par \par At baseline, patient lives alone and uses a walker around the home and a wheelchair outside the home.  She employs someone to clean her home and has meals delivered but used to perform other day-to-day activities by herself.  Her two sons live nearby and will be transporting her to medical appointments. [de-identified] : 9/25/19\par pt is here for follow up.\par She has completed the PET scan and wants to discuss further plan for care.\par She still remains active.\par C/o abdominal pain for which she is on Tramadol as prescribed by her PCP,\par No vaginal bleeding\par \par 10/9/19\par Pt is here for follow up.\par She s/p 1 treatment with carboplatin and taxol.\par She tolerated the treatment well except for 2 episodes of diarrhea.\par pt has been using oxycodone/ acetaminophen 5/325mg 1 tab q 6 hours with relief of symptoms. Requesting another script.\par Denies any significant nausea or vomiting.\par Has poor appetite. Does not like to drink ensure\par \par 10/23/19\par Pt is here for follow up.\par Since last visit she had port placement after which she had an episode of vomiting.\par Her abdominal pain has not changed.\par She has been having constipation X 7 days.\par She states she took a stool softener x 3 times without relief.\par She is able to pass flatus.\par appetite is not improving.\par She has been using oxycodone 5 mg as needed with relief.\par \par 11/13/19\par Pt is here for follow up.\par States the pain is well controlled. She saw a pain Mgt specialist and she is on oxycodone and marinol. The appetite is sometimes improved.\par However she has lost 9 lb since last visit.\par She has a home aide 3 times a week. There is some family support.\par No N, V, D\par \par 12/4/19\par pt is here for follow up.\par Saw Dr Lopez today and may be switching to Fentanyl. \par States that she has decreased appetite, she has not been taking marinol daily.\par She has been having diarrhea, did not take imodium as advised.\par No fever.\par has mild nausea and few episodes of vomiting.\par She has vaginal spotting off and on.\par \par \par 12/16/19\par Patient presents to the office for scheduled chemotherapy.  She missed last week's appointment (Cycle 4, Day 8), due to multiple complaints.  She is accompanied by 3 family members who report she has not been herself over the last 2 weeks.  She has not been eating or drinking much.  She has been very weak and fatigued.  There are days when she won't even speak or respond.  She had been getting PT at home, but she refused to continue.  She has not been taking Marinol BID as prescribed.  Patient states she does not want to stop treatment, she just feels "run down".  Patient denies cough, shortness of breath, denies fever, denies bone pain.  Patient denies abdominal pain or bloating, denies vaginal bleeding or discharge.\par \par 1/16/20\par Pt is here for follow up.\par pt is feeling better.\par Her appetite has improved. She is now on Marinol 10 mg bid prescribed by her pain Mgt specialist.\par Pt is in her bed most of the time.\par She is not getting out of the bed not even to use the bathroom.\par  Patient denies abdominal pain or bloating, denies vaginal bleeding or discharge.\par  Patient denies cough, shortness of breath, denies fever, denies bone pain\par She has not participated in PT for a long time.\par Pt has not needed pain meds as much as before.\par \par 2/6/20\par Pt is here for follow up.\par C/o weakness, not eating well.\par Pt has not been getting up to walk. She has been spending most of her waking hours in bed.\par The physical therapist came only three times.\par  Patient denies cough, shortness of breath, denies fever, denies bone pain\par \par 2/27/20\par Pt is here for follow up.\par Pt 's family is Complaining that she is not eating much and has lost a significant amount of weight since last visit.\par She does not have an appetite, She is not willing to drink fluids either.\par Pt has been trying marinol also and despite that she has no desire to eat.\par Pt is very quiet and answers questions appropriately however will not provide any details.\par The family seems frustrated that she is not agreeing to eat and drink.\par They are asking if she is appropriate for hospice.\par Pt had a PET scan since last follow up.\par Denies vaginal or rectal bleeding\par \par 4/10/20\par Pt is being evaluated via a telehealth visit at the request of her family.\par Per her daughter who lives with her now, she has been completely bed bound for last 2 weeks or so. Since yesterday she is not responding verbally although she is arousable.\par Her appetite has not improved despite Marinol. She was also started on Cymbalta 3 weeks ago with no improvement in her mood.\par Pt has been constipated since 2 weeks. Family tried stool softeners, MOM etc. No vomiting noted.\par Abdomen seems firm per daughter but no tenderness on palpation by the daughter.\par No cough, fever and SOB.\par Family is undecided whether to proceed with hospice\par

## 2020-04-10 NOTE — CONSULT LETTER
[Dear  ___] : Dear  [unfilled], [Consult Letter:] : I had the pleasure of evaluating your patient, [unfilled]. [Please see my note below.] : Please see my note below. [Consult Closing:] : Thank you very much for allowing me to participate in the care of this patient.  If you have any questions, please do not hesitate to contact me. [Sincerely,] : Sincerely, [DrAngel  ___] : Dr. BERG [DrAngel ___] : Dr. BERG [FreeTextEntry3] : Keturah Sneed MD\par Attending Physician\par Cancer Services\par [facility]\par

## 2020-04-10 NOTE — REVIEW OF SYSTEMS
[Fatigue] : fatigue [Incontinence] : incontinence [Difficulty Walking] : difficulty walking [Depression] : depression [Negative] : Heme/Lymph [Abdominal Pain] : no abdominal pain [Dysmenorrhea/Abn Vaginal Bleeding] : no dysmenorrhea/abnormal vaginal bleeding [Joint Pain] : no joint pain [de-identified] : R-sided residual weakness secondary to CVA

## 2020-04-10 NOTE — ASSESSMENT
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 left 5th rib.\par \par Pt was started on dose reduced weekly Carboplatin and Taxol s/p 7 cycles with  PET scan showing a favorable response.\par Pt's functional status has declined significantly Pt appears very depressed.\par Anorexia likely related to depression\par Constipation\par \par RECOMMENDATIONS\par \par I had a detailed discussion with the patient and her family.  \par \par Had a long discussion with the pt's daughter and sons. Her PS is very poor and she is no longer a candidate for chemotherapy.\par Recommended supportive care only. She is an appropriate candidate for hospice. Called hospice and made a referral./\par Family will discuss amongst themselves and decide whether to pursue hospice.\par \par All the questions and concerns addressed by the patient and her family were addressed to their satisfaction.\par Family will contact me next week with their decision.\par \par  [Palliative] : Goals of care discussed with patient: Palliative [Palliative Care Plan] : Patient was apprised of incurable nature of disease.  Hospice and Palliative care options discussed.

## 2020-04-16 ENCOUNTER — APPOINTMENT (OUTPATIENT)
Dept: INFUSION THERAPY | Facility: CLINIC | Age: 85
End: 2020-04-16

## 2020-04-16 ENCOUNTER — APPOINTMENT (OUTPATIENT)
Dept: HEMATOLOGY ONCOLOGY | Facility: CLINIC | Age: 85
End: 2020-04-16

## 2024-06-04 NOTE — ASU PATIENT PROFILE, ADULT - AS SC BRADEN FRICTION
(2) potential problem ,[email or web address]

## 2025-02-05 NOTE — REASON FOR VISIT
Detail Level: Simple Add 96752 Cpt? (Important Note: In 2017 The Use Of 83632 Is Being Tracked By Cms To Determine Future Global Period Reimbursement For Global Periods): no [Follow-Up Visit] : a follow-up [Family Member] : family member [FreeTextEntry2] : adenocarcinoma of cervix vs. endometrium